# Patient Record
Sex: MALE | Race: WHITE | NOT HISPANIC OR LATINO | Employment: OTHER | URBAN - NONMETROPOLITAN AREA
[De-identification: names, ages, dates, MRNs, and addresses within clinical notes are randomized per-mention and may not be internally consistent; named-entity substitution may affect disease eponyms.]

---

## 2022-11-08 ENCOUNTER — APPOINTMENT (EMERGENCY)
Dept: RADIOLOGY | Facility: HOSPITAL | Age: 47
End: 2022-11-08

## 2022-11-08 ENCOUNTER — APPOINTMENT (EMERGENCY)
Dept: CT IMAGING | Facility: HOSPITAL | Age: 47
End: 2022-11-08

## 2022-11-08 ENCOUNTER — HOSPITAL ENCOUNTER (INPATIENT)
Facility: HOSPITAL | Age: 47
LOS: 2 days | Discharge: HOME | End: 2022-11-10
Attending: EMERGENCY MEDICINE | Admitting: STUDENT IN AN ORGANIZED HEALTH CARE EDUCATION/TRAINING PROGRAM

## 2022-11-08 DIAGNOSIS — K08.89 TOOTHACHE: ICD-10-CM

## 2022-11-08 DIAGNOSIS — K92.1 MELENA: Primary | ICD-10-CM

## 2022-11-08 DIAGNOSIS — I10 HYPERTENSION, UNSPECIFIED TYPE: ICD-10-CM

## 2022-11-08 DIAGNOSIS — K92.2 ACUTE UPPER GI BLEEDING: ICD-10-CM

## 2022-11-08 PROBLEM — F32.9 MDD (MAJOR DEPRESSIVE DISORDER): Status: ACTIVE | Noted: 2022-11-08

## 2022-11-08 LAB
ALBUMIN SERPL BCP-MCNC: 3.8 G/DL (ref 3.5–5)
ALP SERPL-CCNC: 95 U/L (ref 46–116)
ALT SERPL W P-5'-P-CCNC: 49 U/L (ref 12–78)
ANION GAP SERPL CALCULATED.3IONS-SCNC: 6 MMOL/L (ref 4–13)
APTT PPP: 25 SECONDS (ref 23–37)
AST SERPL W P-5'-P-CCNC: 28 U/L (ref 5–45)
BASOPHILS # BLD AUTO: 0.05 THOUSANDS/ÂΜL (ref 0–0.1)
BASOPHILS NFR BLD AUTO: 1 % (ref 0–1)
BILIRUB SERPL-MCNC: 0.24 MG/DL (ref 0.2–1)
BUN SERPL-MCNC: 18 MG/DL (ref 5–25)
CALCIUM SERPL-MCNC: 8.2 MG/DL (ref 8.3–10.1)
CHLORIDE SERPL-SCNC: 103 MMOL/L (ref 96–108)
CO2 SERPL-SCNC: 27 MMOL/L (ref 21–32)
CREAT SERPL-MCNC: 1.28 MG/DL (ref 0.6–1.3)
EOSINOPHIL # BLD AUTO: 0.3 THOUSAND/ÂΜL (ref 0–0.61)
EOSINOPHIL NFR BLD AUTO: 3 % (ref 0–6)
ERYTHROCYTE [DISTWIDTH] IN BLOOD BY AUTOMATED COUNT: 13.7 % (ref 11.6–15.1)
GFR SERPL CREATININE-BSD FRML MDRD: 67 ML/MIN/1.73SQ M
GLUCOSE SERPL-MCNC: 100 MG/DL (ref 65–140)
HCT VFR BLD AUTO: 40.9 % (ref 36.5–49.3)
HGB BLD-MCNC: 13.3 G/DL (ref 12–17)
IMM GRANULOCYTES # BLD AUTO: 0.03 THOUSAND/UL (ref 0–0.2)
IMM GRANULOCYTES NFR BLD AUTO: 0 % (ref 0–2)
INR PPP: 0.99 (ref 0.84–1.19)
LIPASE SERPL-CCNC: 138 U/L (ref 73–393)
LYMPHOCYTES # BLD AUTO: 3.33 THOUSANDS/ÂΜL (ref 0.6–4.47)
LYMPHOCYTES NFR BLD AUTO: 36 % (ref 14–44)
MAGNESIUM SERPL-MCNC: 1.8 MG/DL (ref 1.6–2.6)
MCH RBC QN AUTO: 27.5 PG (ref 26.8–34.3)
MCHC RBC AUTO-ENTMCNC: 32.5 G/DL (ref 31.4–37.4)
MCV RBC AUTO: 85 FL (ref 82–98)
MONOCYTES # BLD AUTO: 0.71 THOUSAND/ÂΜL (ref 0.17–1.22)
MONOCYTES NFR BLD AUTO: 8 % (ref 4–12)
NEUTROPHILS # BLD AUTO: 4.76 THOUSANDS/ÂΜL (ref 1.85–7.62)
NEUTS SEG NFR BLD AUTO: 52 % (ref 43–75)
NRBC BLD AUTO-RTO: 0 /100 WBCS
PLATELET # BLD AUTO: 330 THOUSANDS/UL (ref 149–390)
PMV BLD AUTO: 9 FL (ref 8.9–12.7)
POTASSIUM SERPL-SCNC: 4.1 MMOL/L (ref 3.5–5.3)
PROT SERPL-MCNC: 7.4 G/DL (ref 6.4–8.4)
PROTHROMBIN TIME: 13.2 SECONDS (ref 11.6–14.5)
RBC # BLD AUTO: 4.84 MILLION/UL (ref 3.88–5.62)
SODIUM SERPL-SCNC: 136 MMOL/L (ref 135–147)
WBC # BLD AUTO: 9.18 THOUSAND/UL (ref 4.31–10.16)

## 2022-11-08 RX ORDER — BUPROPION HYDROCHLORIDE 150 MG/1
300 TABLET ORAL DAILY
Status: DISCONTINUED | OUTPATIENT
Start: 2022-11-09 | End: 2022-11-08

## 2022-11-08 RX ORDER — TRAMADOL HYDROCHLORIDE 50 MG/1
50 TABLET ORAL EVERY 6 HOURS PRN
Status: DISCONTINUED | OUTPATIENT
Start: 2022-11-08 | End: 2022-11-10 | Stop reason: HOSPADM

## 2022-11-08 RX ORDER — ESCITALOPRAM OXALATE 10 MG/1
10 TABLET ORAL DAILY
Status: DISCONTINUED | OUTPATIENT
Start: 2022-11-08 | End: 2022-11-10 | Stop reason: HOSPADM

## 2022-11-08 RX ORDER — ONDANSETRON 2 MG/ML
4 INJECTION INTRAMUSCULAR; INTRAVENOUS EVERY 6 HOURS PRN
Status: DISCONTINUED | OUTPATIENT
Start: 2022-11-08 | End: 2022-11-10 | Stop reason: HOSPADM

## 2022-11-08 RX ORDER — BUPROPION HYDROCHLORIDE 300 MG/1
300 TABLET ORAL DAILY
COMMUNITY

## 2022-11-08 RX ORDER — AMOXICILLIN 250 MG/1
500 CAPSULE ORAL EVERY 8 HOURS SCHEDULED
Status: DISCONTINUED | OUTPATIENT
Start: 2022-11-08 | End: 2022-11-10 | Stop reason: HOSPADM

## 2022-11-08 RX ORDER — OXYCODONE HYDROCHLORIDE 5 MG/1
5 TABLET ORAL ONCE
Status: COMPLETED | OUTPATIENT
Start: 2022-11-08 | End: 2022-11-08

## 2022-11-08 RX ORDER — BUPROPION HYDROCHLORIDE 150 MG/1
300 TABLET ORAL DAILY
Status: DISCONTINUED | OUTPATIENT
Start: 2022-11-08 | End: 2022-11-10 | Stop reason: HOSPADM

## 2022-11-08 RX ORDER — ACETAMINOPHEN 325 MG/1
650 TABLET ORAL EVERY 6 HOURS PRN
Status: DISCONTINUED | OUTPATIENT
Start: 2022-11-08 | End: 2022-11-10 | Stop reason: HOSPADM

## 2022-11-08 RX ORDER — ESCITALOPRAM OXALATE 10 MG/1
10 TABLET ORAL DAILY
Status: DISCONTINUED | OUTPATIENT
Start: 2022-11-09 | End: 2022-11-08

## 2022-11-08 RX ORDER — ACETAMINOPHEN 325 MG/1
975 TABLET ORAL ONCE
Status: COMPLETED | OUTPATIENT
Start: 2022-11-08 | End: 2022-11-08

## 2022-11-08 RX ORDER — LISINOPRIL 20 MG/1
20 TABLET ORAL DAILY
Status: DISCONTINUED | OUTPATIENT
Start: 2022-11-09 | End: 2022-11-10 | Stop reason: HOSPADM

## 2022-11-08 RX ORDER — TRAMADOL HYDROCHLORIDE 50 MG/1
50 TABLET ORAL EVERY 6 HOURS PRN
Status: DISCONTINUED | OUTPATIENT
Start: 2022-11-08 | End: 2022-11-08 | Stop reason: SDUPTHER

## 2022-11-08 RX ORDER — TRAMADOL HYDROCHLORIDE 50 MG/1
50 TABLET ORAL EVERY 6 HOURS PRN
COMMUNITY

## 2022-11-08 RX ORDER — ESCITALOPRAM OXALATE 10 MG/1
10 TABLET ORAL DAILY
COMMUNITY

## 2022-11-08 RX ADMIN — AMOXICILLIN 500 MG: 250 CAPSULE ORAL at 21:22

## 2022-11-08 RX ADMIN — SODIUM CHLORIDE 1000 ML: 0.9 INJECTION, SOLUTION INTRAVENOUS at 16:38

## 2022-11-08 RX ADMIN — BUPROPION HYDROCHLORIDE 300 MG: 150 TABLET, EXTENDED RELEASE ORAL at 21:22

## 2022-11-08 RX ADMIN — ESCITALOPRAM OXALATE 10 MG: 10 TABLET ORAL at 21:22

## 2022-11-08 RX ADMIN — SODIUM CHLORIDE 80 MG: 9 INJECTION, SOLUTION INTRAVENOUS at 16:47

## 2022-11-08 RX ADMIN — TRAMADOL HYDROCHLORIDE 50 MG: 50 TABLET ORAL at 22:49

## 2022-11-08 RX ADMIN — OXYCODONE HYDROCHLORIDE 5 MG: 5 TABLET ORAL at 17:55

## 2022-11-08 RX ADMIN — ACETAMINOPHEN 975 MG: 325 TABLET ORAL at 16:53

## 2022-11-08 RX ADMIN — SODIUM CHLORIDE 8 MG/HR: 900 INJECTION, SOLUTION INTRAVENOUS at 16:48

## 2022-11-08 RX ADMIN — IOHEXOL 100 ML: 350 INJECTION, SOLUTION INTRAVENOUS at 18:03

## 2022-11-08 NOTE — ED PROVIDER NOTES
History  Chief Complaint   Patient presents with   • Rectal Bleeding     Patient noticed blood in stool today  Patient stated yesterday he felt weak, had a syncopal episode and felt sob  Today patient feels the same  The patient is a 41year old male, who presents with the c/o melena x today  The patient states that he has been dealing with dental pain has been taking antibiotics and naproxen  Patient states that today was his 1st bowel movement in a few days  Patient states that it was dark black  Patient states he had some “acid reflux like pain last evening” in his left upper abdomen  States it was a burning sensation  Did take his omeprazole without improvement  Patient states that today he has a generalized ache in his abdomen  No nausea vomiting fevers chills  Rectal Bleeding - Minor  Quality:  Black and tarry  Amount: Moderate  Duration:  1 day  Chronicity:  New  Similar prior episodes: no    Relieved by:  None tried  Worsened by:  Nothing  Ineffective treatments:  None tried  Associated symptoms: abdominal pain    Associated symptoms: no dizziness, no fever, no hematemesis, no light-headedness, no recent illness and no vomiting    Abdominal pain:     Location:  Generalized    Quality: aching      Severity:  Mild    Onset quality:  Unable to specify    Timing:  Constant    Progression:  Unable to specify    Chronicity:  New  Risk factors: NSAID use        None       Past Medical History:   Diagnosis Date   • Hypertension    • Psychiatric disorder        Past Surgical History:   Procedure Laterality Date   • HERNIA REPAIR         History reviewed  No pertinent family history  I have reviewed and agree with the history as documented      E-Cigarette/Vaping   • E-Cigarette Use Never User      E-Cigarette/Vaping Substances     Social History     Tobacco Use   • Smoking status: Never Smoker   • Smokeless tobacco: Never Used   Vaping Use   • Vaping Use: Never used   Substance Use Topics   • Alcohol use: Not Currently   • Drug use: Not Currently       Review of Systems   Constitutional: Negative for chills and fever  HENT: Negative for ear pain  Eyes: Negative for pain and visual disturbance  Respiratory: Negative for shortness of breath and stridor  Cardiovascular: Negative for chest pain and palpitations  Gastrointestinal: Positive for abdominal pain and hematochezia  Negative for hematemesis, nausea and vomiting  Genitourinary: Negative for dysuria and hematuria  Musculoskeletal: Negative for arthralgias and back pain  Skin: Negative for color change and rash  Neurological: Negative for dizziness, seizures, speech difficulty and light-headedness  All other systems reviewed and are negative  Physical Exam  Physical Exam  Vitals and nursing note reviewed  Constitutional:       Appearance: He is well-developed  HENT:      Head: Normocephalic and atraumatic  Eyes:      Extraocular Movements: Extraocular movements intact  Conjunctiva/sclera: Conjunctivae normal       Pupils: Pupils are equal, round, and reactive to light  Cardiovascular:      Rate and Rhythm: Normal rate and regular rhythm  Heart sounds: No murmur heard  Pulmonary:      Effort: Pulmonary effort is normal  No respiratory distress  Breath sounds: Normal breath sounds  Abdominal:      Palpations: Abdomen is soft  Tenderness: There is no abdominal tenderness  There is no right CVA tenderness or left CVA tenderness  Genitourinary:     Rectum: Guaiac result positive  No external hemorrhoid or internal hemorrhoid  Comments: Dark black stool on VASILE   Musculoskeletal:      Cervical back: Neck supple  Right lower leg: No edema  Left lower leg: No edema  Skin:     General: Skin is warm and dry  Capillary Refill: Capillary refill takes less than 2 seconds  Neurological:      General: No focal deficit present        Mental Status: He is alert and oriented to person, place, and time           Vital Signs  ED Triage Vitals [11/08/22 1618]   Temperature Pulse Respirations Blood Pressure SpO2   99 1 °F (37 3 °C) 99 20 145/86 96 %      Temp Source Heart Rate Source Patient Position - Orthostatic VS BP Location FiO2 (%)   Temporal Monitor Lying Right arm --      Pain Score       8           Vitals:    11/08/22 1618 11/08/22 1737 11/08/22 1815 11/08/22 1900   BP: 145/86 144/70 157/92 116/62   Pulse: 99 91 85 90   Patient Position - Orthostatic VS: Lying            Visual Acuity      ED Medications  Medications   pantoprazole (PROTONIX) 80 mg in sodium chloride 0 9 % 100 mL infusion (8 mg/hr Intravenous New Bag 11/8/22 1648)   ondansetron (ZOFRAN) injection 4 mg (has no administration in time range)   acetaminophen (TYLENOL) tablet 650 mg (has no administration in time range)   traMADol (ULTRAM) tablet 50 mg (has no administration in time range)   buPROPion (WELLBUTRIN XL) 24 hr tablet 300 mg (has no administration in time range)   lisinopril (ZESTRIL) tablet 20 mg (has no administration in time range)   escitalopram (LEXAPRO) tablet 10 mg (has no administration in time range)   traMADol (ULTRAM) tablet 50 mg (has no administration in time range)   amoxicillin (AMOXIL) capsule 500 mg (has no administration in time range)   sodium chloride 0 9 % bolus 1,000 mL (0 mL Intravenous Stopped 11/8/22 1739)   pantoprazole (PROTONIX) 80 mg in sodium chloride 0 9 % 100 mL IVPB (0 mg Intravenous Stopped 11/8/22 1702)   acetaminophen (TYLENOL) tablet 975 mg (975 mg Oral Given 11/8/22 1653)   oxyCODONE (ROXICODONE) IR tablet 5 mg (5 mg Oral Given 11/8/22 1755)   iohexol (OMNIPAQUE) 350 MG/ML injection (SINGLE-DOSE) 100 mL (100 mL Intravenous Given 11/8/22 1803)       Diagnostic Studies  Results Reviewed     Procedure Component Value Units Date/Time    Comprehensive metabolic panel [[id number]]  (Abnormal) Collected: 11/08/22 1638    Lab Status: Final result Specimen: Blood from Arm, Right Updated: 11/08/22 1721     Sodium 136 mmol/L      Potassium 4 1 mmol/L      Chloride 103 mmol/L      CO2 27 mmol/L      ANION GAP 6 mmol/L      BUN 18 mg/dL      Creatinine 1 28 mg/dL      Glucose 100 mg/dL      Calcium 8 2 mg/dL      AST 28 U/L      ALT 49 U/L      Alkaline Phosphatase 95 U/L      Total Protein 7 4 g/dL      Albumin 3 8 g/dL      Total Bilirubin 0 24 mg/dL      eGFR 67 ml/min/1 73sq m     Narrative:      [place] guidelines for Chronic Kidney Disease (CKD):   •  Stage 1 with normal or high GFR (GFR > 90 mL/min/1 73 square meters)  •  Stage 2 Mild CKD (GFR = 60-89 mL/min/1 73 square meters)  •  Stage 3A Moderate CKD (GFR = 45-59 mL/min/1 73 square meters)  •  Stage 3B Moderate CKD (GFR = 30-44 mL/min/1 73 square meters)  •  Stage 4 Severe CKD (GFR = 15-29 mL/min/1 73 square meters)  •  Stage 5 End Stage CKD (GFR <15 mL/min/1 73 square meters)  Note: GFR calculation is accurate only with a steady state creatinine    Magnesium [[id number]]  (Normal) Collected: 11/08/22 1638    Lab Status: Final result Specimen: Blood from Arm, Right Updated: 11/08/22 1721     Magnesium 1 8 mg/dL     Lipase [[id number]]  (Normal) Collected: 11/08/22 1638    Lab Status: Final result Specimen: Blood from Arm, Right Updated: 11/08/22 1721     Lipase 138 u/L     Protime-INR [[id number]]  (Normal) Collected: 11/08/22 1638    Lab Status: Final result Specimen: Blood from Arm, Right Updated: 11/08/22 1658     Protime 13 2 seconds      INR 0 99    APTT [[id number]]  (Normal) Collected: 11/08/22 1638    Lab Status: Final result Specimen: Blood from Arm, Right Updated: 11/08/22 1658     PTT 25 seconds     CBC and differential [[id number]] Collected: 11/08/22 1638    Lab Status: Final result Specimen: Blood from Arm, Right Updated: 11/08/22 1645     WBC 9 18 Thousand/uL      RBC 4 84 Million/uL      Hemoglobin 13 3 g/dL      Hematocrit 40 9 %      MCV 85 fL      MCH 27 5 pg      MCHC 32 5 g/dL      RDW 13 7 %      MPV 9 0 fL      Platelets 416 Thousands/uL      nRBC 0 /100 WBCs      Neutrophils Relative 52 %      Immat GRANS % 0 %      Lymphocytes Relative 36 %      Monocytes Relative 8 %      Eosinophils Relative 3 %      Basophils Relative 1 %      Neutrophils Absolute 4 76 Thousands/µL      Immature Grans Absolute 0 03 Thousand/uL      Lymphocytes Absolute 3 33 Thousands/µL      Monocytes Absolute 0 71 Thousand/µL      Eosinophils Absolute 0 30 Thousand/µL      Basophils Absolute 0 05 Thousands/µL     UA w Reflex to Microscopic w Reflex to Culture [[id number]]     Lab Status: No result Specimen: Urine                  CT abdomen pelvis with contrast   Final Result by Celia Krishna MD (11/08 1900)      No acute inflammatory changes in the abdomen or the pelvis  Workstation performed: [id number]         XR chest 1 view portable    (Results Pending)              Procedures  Procedures         ED Course  ED Course as of 11/08/22 1927 Tue Nov 08, 2022   1650 Hemoglobin: 13 3   1835 Speaking a Dr Paz Mix with GI about patient   1918 Patient placed on observation, GI consult placed for tomorrow                                                MDM  Number of Diagnoses or Management Options     Amount and/or Complexity of Data Reviewed  Clinical lab tests: ordered and reviewed  Tests in the radiology section of CPT®: ordered and reviewed  Decide to obtain previous medical records or to obtain history from someone other than the patient: yes  Review and summarize past medical records: yes  Independent visualization of images, tracings, or specimens: yes    Risk of Complications, Morbidity, and/or Mortality  Presenting problems: moderate  Diagnostic procedures: moderate  Management options: moderate    Patient Progress  Patient progress: stable      Disposition  Final diagnoses:   Melena     Time reflects when diagnosis was documented in both MDM as applicable and the Disposition within this note     Time User Action Codes Description Comment    11/8/2022  7:13 PM Susanna Gunn Add [K92 1] Dahlia       ED Disposition     ED Disposition   Admit    Condition   Stable    Date/Time   Tue Nov 8, 2022  7:17 PM    Comment   Case was discussed with lola morse  and the patient's admission status was agreed to be Admission Status: inpatient status to the service of Dr Paresh Jesus    Follow-up Information    None         Patient's Medications    No medications on file       No discharge procedures on file      PDMP Review     None          ED Provider  Electronically Signed by           Toyin Melvin PA-C  11/08/22 1928

## 2022-11-08 NOTE — ED NOTES
Pt adamantly requesting morphine for a toothache  Provider made aware        Preston Myers RN  42/09/87 [id number]

## 2022-11-09 ENCOUNTER — ANESTHESIA (INPATIENT)
Dept: GASTROENTEROLOGY | Facility: HOSPITAL | Age: 47
End: 2022-11-09

## 2022-11-09 ENCOUNTER — ANESTHESIA EVENT (INPATIENT)
Dept: GASTROENTEROLOGY | Facility: HOSPITAL | Age: 47
End: 2022-11-09

## 2022-11-09 ENCOUNTER — APPOINTMENT (OUTPATIENT)
Dept: GASTROENTEROLOGY | Facility: HOSPITAL | Age: 47
End: 2022-11-09

## 2022-11-09 PROBLEM — E66.9 OBESITY (BMI 30-39.9): Status: ACTIVE | Noted: 2022-11-09

## 2022-11-09 LAB
BILIRUB UR QL STRIP: NEGATIVE
CLARITY UR: CLEAR
COLOR UR: YELLOW
GLUCOSE UR STRIP-MCNC: NEGATIVE MG/DL
HGB BLD-MCNC: 11.3 G/DL (ref 12–17)
HGB BLD-MCNC: 12.8 G/DL (ref 12–17)
HGB UR QL STRIP.AUTO: NEGATIVE
KETONES UR STRIP-MCNC: NEGATIVE MG/DL
LEUKOCYTE ESTERASE UR QL STRIP: NEGATIVE
NITRITE UR QL STRIP: NEGATIVE
PH UR STRIP.AUTO: 5.5 [PH]
PROT UR STRIP-MCNC: NEGATIVE MG/DL
SP GR UR STRIP.AUTO: 1.01 (ref 1–1.03)
UROBILINOGEN UR QL STRIP.AUTO: 0.2 E.U./DL

## 2022-11-09 PROCEDURE — 0DB68ZX EXCISION OF STOMACH, VIA NATURAL OR ARTIFICIAL OPENING ENDOSCOPIC, DIAGNOSTIC: ICD-10-PCS | Performed by: INTERNAL MEDICINE

## 2022-11-09 RX ORDER — PREGABALIN 75 MG/1
75 CAPSULE ORAL 2 TIMES DAILY
COMMUNITY

## 2022-11-09 RX ORDER — LIDOCAINE HYDROCHLORIDE 20 MG/ML
INJECTION, SOLUTION EPIDURAL; INFILTRATION; INTRACAUDAL; PERINEURAL AS NEEDED
Status: DISCONTINUED | OUTPATIENT
Start: 2022-11-09 | End: 2022-11-09

## 2022-11-09 RX ORDER — SODIUM CHLORIDE 9 MG/ML
100 INJECTION, SOLUTION INTRAVENOUS CONTINUOUS
Status: DISCONTINUED | OUTPATIENT
Start: 2022-11-09 | End: 2022-11-10

## 2022-11-09 RX ORDER — SODIUM CHLORIDE, SODIUM LACTATE, POTASSIUM CHLORIDE, CALCIUM CHLORIDE 600; 310; 30; 20 MG/100ML; MG/100ML; MG/100ML; MG/100ML
INJECTION, SOLUTION INTRAVENOUS CONTINUOUS PRN
Status: DISCONTINUED | OUTPATIENT
Start: 2022-11-09 | End: 2022-11-09

## 2022-11-09 RX ORDER — PREGABALIN 75 MG/1
75 CAPSULE ORAL 2 TIMES DAILY
Status: DISCONTINUED | OUTPATIENT
Start: 2022-11-09 | End: 2022-11-10 | Stop reason: HOSPADM

## 2022-11-09 RX ORDER — OMEPRAZOLE 40 MG/1
40 CAPSULE, DELAYED RELEASE ORAL DAILY
COMMUNITY
End: 2022-11-10

## 2022-11-09 RX ORDER — LANOLIN ALCOHOL/MO/W.PET/CERES
6 CREAM (GRAM) TOPICAL
Status: DISCONTINUED | OUTPATIENT
Start: 2022-11-09 | End: 2022-11-10 | Stop reason: HOSPADM

## 2022-11-09 RX ORDER — PROPOFOL 10 MG/ML
INJECTION, EMULSION INTRAVENOUS AS NEEDED
Status: DISCONTINUED | OUTPATIENT
Start: 2022-11-09 | End: 2022-11-09

## 2022-11-09 RX ORDER — SODIUM CHLORIDE, SODIUM LACTATE, POTASSIUM CHLORIDE, CALCIUM CHLORIDE 600; 310; 30; 20 MG/100ML; MG/100ML; MG/100ML; MG/100ML
75 INJECTION, SOLUTION INTRAVENOUS CONTINUOUS
Status: DISCONTINUED | OUTPATIENT
Start: 2022-11-09 | End: 2022-11-10

## 2022-11-09 RX ADMIN — SODIUM CHLORIDE 100 ML/HR: 0.9 INJECTION, SOLUTION INTRAVENOUS at 20:56

## 2022-11-09 RX ADMIN — ESCITALOPRAM OXALATE 10 MG: 10 TABLET ORAL at 08:56

## 2022-11-09 RX ADMIN — AMOXICILLIN 500 MG: 250 CAPSULE ORAL at 05:11

## 2022-11-09 RX ADMIN — SODIUM CHLORIDE, SODIUM LACTATE, POTASSIUM CHLORIDE, AND CALCIUM CHLORIDE: .6; .31; .03; .02 INJECTION, SOLUTION INTRAVENOUS at 10:21

## 2022-11-09 RX ADMIN — PROPOFOL 40 MG: 10 INJECTION, EMULSION INTRAVENOUS at 10:28

## 2022-11-09 RX ADMIN — SODIUM CHLORIDE 100 ML/HR: 0.9 INJECTION, SOLUTION INTRAVENOUS at 11:11

## 2022-11-09 RX ADMIN — PREGABALIN 75 MG: 75 CAPSULE ORAL at 11:19

## 2022-11-09 RX ADMIN — ACETAMINOPHEN 650 MG: 325 TABLET ORAL at 21:16

## 2022-11-09 RX ADMIN — PROPOFOL 40 MG: 10 INJECTION, EMULSION INTRAVENOUS at 10:31

## 2022-11-09 RX ADMIN — PROPOFOL 10 MG: 10 INJECTION, EMULSION INTRAVENOUS at 10:26

## 2022-11-09 RX ADMIN — BUPROPION HYDROCHLORIDE 300 MG: 150 TABLET, EXTENDED RELEASE ORAL at 08:56

## 2022-11-09 RX ADMIN — AMOXICILLIN 500 MG: 250 CAPSULE ORAL at 13:02

## 2022-11-09 RX ADMIN — TRAMADOL HYDROCHLORIDE 50 MG: 50 TABLET ORAL at 05:11

## 2022-11-09 RX ADMIN — PREGABALIN 75 MG: 75 CAPSULE ORAL at 17:28

## 2022-11-09 RX ADMIN — LISINOPRIL 20 MG: 20 TABLET ORAL at 08:56

## 2022-11-09 RX ADMIN — PROPOFOL 150 MG: 10 INJECTION, EMULSION INTRAVENOUS at 10:25

## 2022-11-09 RX ADMIN — TRAMADOL HYDROCHLORIDE 50 MG: 50 TABLET ORAL at 11:19

## 2022-11-09 RX ADMIN — SODIUM CHLORIDE 8 MG/HR: 900 INJECTION, SOLUTION INTRAVENOUS at 12:55

## 2022-11-09 RX ADMIN — TRAMADOL HYDROCHLORIDE 50 MG: 50 TABLET ORAL at 17:28

## 2022-11-09 RX ADMIN — ACETAMINOPHEN 650 MG: 325 TABLET ORAL at 15:14

## 2022-11-09 RX ADMIN — LIDOCAINE HYDROCHLORIDE 100 MG: 20 INJECTION, SOLUTION EPIDURAL; INFILTRATION; INTRACAUDAL at 10:25

## 2022-11-09 RX ADMIN — ACETAMINOPHEN 650 MG: 325 TABLET ORAL at 08:59

## 2022-11-09 RX ADMIN — AMOXICILLIN 500 MG: 250 CAPSULE ORAL at 21:16

## 2022-11-09 NOTE — ASSESSMENT & PLAN NOTE
· Patient has a history of ulcer many years ago  Has been taking naproxen twice a day for some time  Developed upper abdominal pain had a bowel movement today with melena, syncopal episode in pharmacy and ER  · NPO,  Protonix drip      · Gastroenterology for EGD 11/9  · Hemoglobin is stable 12, baseline in Care everywhere 11-13

## 2022-11-09 NOTE — PROGRESS NOTES
Patient is alert and oriented  Encouraged patient to use call bell to ring for assistance if needs to get up post EGD  Patient tolerating lunch and medication PO  Patient requiring prn pain medication due to a tooth ache on his Right lower tooth

## 2022-11-09 NOTE — H&P
[address]  H&P- Vianney Reinoso 1975, 41 y o  male MRN: [id number]  Unit/Bed#: ED 02 Encounter: [id number]  Primary Care Provider: Jarocho Casanova MD   Date and time admitted to hospital: 11/8/2022  4:14 PM    * Acute upper GI bleeding  Assessment & Plan  · Patient has a history of ulcer many years ago  Has been taking naproxen twice a day for some time  Developed upper abdominal pain had a bowel movement today with melena  Hemoglobin is stable  Start clears NPO at midnight Protonix drip  Gastroenterology for EGD   · H&H q 8 hours hemodynamically stable    Hypertension  Assessment & Plan  · Resume lisinopril controlled    MDD (major depressive disorder)  Assessment & Plan  · Continue Wellbutrin Lexapro    Toothache  Assessment & Plan  · Left lower for some time he has an appointment with dentist on Thursday he currently got antibiotics amoxicilin 500 mg p o  T i d  also will give some tramadol for pain      VTE Pharmacologic Prophylaxis: VTE Score: 2 Low Risk (Score 0-2) - Encourage Ambulation  Code Status: Level 1 - Full Code   Discussion with family: patient    Anticipated Length of Stay: Patient will be admitted on an inpatient basis with an anticipated length of stay of greater than 2 midnights secondary to GI bleed  Total Time for Visit, including Counseling / Coordination of Care: 60 minutes Greater than 50% of this total time spent on direct patient counseling and coordination of care  Chief Complaint:  Melena    History of Present Illness:  Vianney Reinoso is a 41 y o  male with a PMH of peptic ulcer disease and hypertension who presents with with episode of melena today in some epigastric pain  Patient has had a toothache for some time and has been taking naproxen twice a day for some time  States that meloxicam his stop taking while he has naproxen with a toothache his appointment with dentist on Thursday and he has been placed on antibiotics amoxicillin      Review of Systems:  Review of Systems   Constitutional: Negative for chills and fever  HENT: Negative for ear pain and sore throat  Eyes: Negative for pain and visual disturbance  Respiratory: Negative for cough and shortness of breath  Cardiovascular: Negative for chest pain and palpitations  Gastrointestinal: Positive for abdominal pain and blood in stool  Negative for vomiting  Genitourinary: Negative for dysuria and hematuria  Musculoskeletal: Negative for arthralgias and back pain  Skin: Negative for color change and rash  Neurological: Negative for seizures and syncope  All other systems reviewed and are negative  Past Medical and Surgical History:   Past Medical History:   Diagnosis Date   • Hypertension    • Psychiatric disorder        Past Surgical History:   Procedure Laterality Date   • HERNIA REPAIR         Meds/Allergies:  Prior to Admission medications    Not on File     I have reviewed home medications with patient personally  Allergies: No Known Allergies    Social History:  Marital Status: Unknown   Substance Use History:   Social History     Substance and Sexual Activity   Alcohol Use Not Currently     Social History     Tobacco Use   Smoking Status Never Smoker   Smokeless Tobacco Never Used     Social History     Substance and Sexual Activity   Drug Use Not Currently       Family History:  History reviewed  No pertinent family history  Physical Exam:     Vitals:   Blood Pressure: 116/62 (11/08/22 1900)  Pulse: 90 (11/08/22 1900)  Temperature: 99 1 °F (37 3 °C) (11/08/22 1618)  Temp Source: Temporal (11/08/22 1618)  Respirations: 20 (11/08/22 1900)  Weight - Scale: 118 kg (259 lb 1 6 oz) (11/08/22 1618)  SpO2: 98 % (11/08/22 1900)    Physical Exam  Vitals and nursing note reviewed  Constitutional:       Appearance: He is well-developed  HENT:      Head: Normocephalic and atraumatic     Eyes:      Conjunctiva/sclera: Conjunctivae normal    Cardiovascular:      Rate and Rhythm: Normal rate and regular rhythm  Heart sounds: No murmur heard  Pulmonary:      Effort: Pulmonary effort is normal  No respiratory distress  Breath sounds: Normal breath sounds  No wheezing or rales  Abdominal:      Palpations: Abdomen is soft  Tenderness: There is abdominal tenderness (epigastric)  Musculoskeletal:         General: No swelling  Cervical back: Neck supple  Skin:     General: Skin is warm and dry  Neurological:      Mental Status: He is alert and oriented to person, place, and time  Additional Data:     Lab Results:  Results from last 7 days   Lab Units 11/08/22  1638   WBC Thousand/uL 9 18   HEMOGLOBIN g/dL 13 3   HEMATOCRIT % 40 9   PLATELETS Thousands/uL 330   NEUTROS PCT % 52   LYMPHS PCT % 36   MONOS PCT % 8   EOS PCT % 3     Results from last 7 days   Lab Units 11/08/22  1638   SODIUM mmol/L 136   POTASSIUM mmol/L 4 1   CHLORIDE mmol/L 103   CO2 mmol/L 27   BUN mg/dL 18   CREATININE mg/dL 1 28   ANION GAP mmol/L 6   CALCIUM mg/dL 8 2*   ALBUMIN g/dL 3 8   TOTAL BILIRUBIN mg/dL 0 24   ALK PHOS U/L 95   ALT U/L 49   AST U/L 28   GLUCOSE RANDOM mg/dL 100     Results from last 7 days   Lab Units 11/08/22  1638   INR  0 99                   Imaging: Reviewed radiology reports from this admission including: abdominal/pelvic CT  CT abdomen pelvis with contrast   Final Result by Kaitlin Washington MD (11/08 1900)      No acute inflammatory changes in the abdomen or the pelvis  Workstation performed: [id number]         XR chest 1 view portable    (Results Pending)       EKG and Other Studies Reviewed on Admission:   · EKG: No EKG obtained  ** Please Note: This note has been constructed using a voice recognition system   **

## 2022-11-09 NOTE — PLAN OF CARE
Problem: GASTROINTESTINAL - ADULT  Goal: Maintains or returns to baseline bowel function  Description: INTERVENTIONS:  - Assess bowel function  - Encourage oral fluids to ensure adequate hydration  - Administer IV fluids if ordered to ensure adequate hydration  - Administer ordered medications as needed PPI  - Encourage mobilization and activity  - Consider nutritional services referral to assist patient with adequate nutrition and appropriate food choices  Outcome: Progressing

## 2022-11-09 NOTE — PROGRESS NOTES
[address]  Progress Note Danette Lucas 1975, 41 y o  male MRN: [id number]  Unit/Bed#: -01 Encounter: [id number]  Primary Care Provider: Koki Haddad MD   Date and time admitted to hospital: 11/8/2022  4:14 PM    * Acute upper GI bleeding  Assessment & Plan  · Patient has a history of ulcer many years ago  Has been taking naproxen twice a day for some time  Developed upper abdominal pain had a bowel movement today with melena, syncopal episode in pharmacy and ER  · NPO,  Protonix drip  · Gastroenterology for EGD 11/9  · Hemoglobin is stable 12, baseline in Care everywhere 11-13     Hypertension  Assessment & Plan  · Resume lisinopril controlled  · consider holding with BP 110s this AM and x2 syncopal episodes     MDD (major depressive disorder)  Assessment & Plan  · Continue Wellbutrin Lexapro    Toothache  Assessment & Plan  · Left lower for some time he has an appointment with dentist on Thursday he currently got antibiotics amoxicilin 500 mg p o  T i d    · PRN tramadol for pain  · PDMP reviewed filler tylenol w/codine 11/6  · Dentist appointment scheduled 1030am 11/10- will discharge tomorrow AM with no ride today and unable to drive self with EGD sedation      VTE Pharmacologic Prophylaxis: VTE Score: 2 Low Risk (Score 0-2) - Encourage Ambulation  Patient Centered Rounds: I performed bedside rounds with nursing staff today  Discussions with Specialists or Other Care Team Provider: GI, CM    Education and Discussions with Family / Patient: Patient declined call to   Time Spent for Care: 30 minutes  More than 50% of total time spent on counseling and coordination of care as described above  Current Length of Stay: 1 day(s)  Current Patient Status: Inpatient   Certification Statement: The patient will continue to require additional inpatient hospital stay due to EGD today  Discharge Plan: Anticipate discharge tomorrow to home      Code Status: Level 1 - Full Code    Subjective:   Patient states he has been taking naproxen more than normal a with lower 2s infection and has dentist appointment tomorrow at 10:30 a m  For extraction  Scheduled for EGD today, patient states he does not have a ride home to be discharged this afternoon, no and he could call to provider ride and he drove himself to the ED  Unable to discharge patient after procedure without a ride due to receiving sedation, will plan for discharge tomorrow morning for him to be able to make his scheduled dental appointment  Patient states he still feeling "woozy" and weak    Objective:     Vitals:   Temp (24hrs), Av 5 °F (36 9 °C), Min:97 9 °F (36 6 °C), Max:99 1 °F (37 3 °C)    Temp:  [97 9 °F (36 6 °C)-99 1 °F (37 3 °C)] 97 9 °F (36 6 °C)  HR:  [85-99] 90  Resp:  [16-20] 16  BP: (116-159)/() 118/80  SpO2:  [96 %-100 %] 98 %  There is no height or weight on file to calculate BMI  Input and Output Summary (last 24 hours): Intake/Output Summary (Last 24 hours) at 2022 0943  Last data filed at 2022 2259  Gross per 24 hour   Intake 641 83 ml   Output 200 ml   Net 441 83 ml       Physical Exam:   Physical Exam  Vitals and nursing note reviewed  Constitutional:       Appearance: He is well-developed  He is not ill-appearing  HENT:      Head: Normocephalic and atraumatic  Mouth/Throat:      Mouth: Mucous membranes are moist    Eyes:      Conjunctiva/sclera: Conjunctivae normal       Pupils: Pupils are equal, round, and reactive to light  Cardiovascular:      Rate and Rhythm: Normal rate and regular rhythm  Pulses: Normal pulses  Heart sounds: Normal heart sounds  No murmur heard  Pulmonary:      Effort: Pulmonary effort is normal  No respiratory distress  Breath sounds: Normal breath sounds  Abdominal:      General: Bowel sounds are normal  There is no distension  Palpations: Abdomen is soft  Tenderness:  There is no abdominal tenderness  Musculoskeletal:      Cervical back: Neck supple  Right lower leg: No edema  Left lower leg: No edema  Skin:     General: Skin is warm and dry  Neurological:      General: No focal deficit present  Mental Status: He is alert  Psychiatric:         Mood and Affect: Mood normal          Thought Content: Thought content normal           Additional Data:     Labs:  Results from last 7 days   Lab Units 11/09/22  0802 11/09/22  0018 11/08/22  1638   WBC Thousand/uL  --   --  9 18   HEMOGLOBIN g/dL 12 8   < > 13 3   HEMATOCRIT %  --   --  40 9   PLATELETS Thousands/uL  --   --  330   NEUTROS PCT %  --   --  52   LYMPHS PCT %  --   --  36   MONOS PCT %  --   --  8   EOS PCT %  --   --  3    < > = values in this interval not displayed       Results from last 7 days   Lab Units 11/08/22  1638   SODIUM mmol/L 136   POTASSIUM mmol/L 4 1   CHLORIDE mmol/L 103   CO2 mmol/L 27   BUN mg/dL 18   CREATININE mg/dL 1 28   ANION GAP mmol/L 6   CALCIUM mg/dL 8 2*   ALBUMIN g/dL 3 8   TOTAL BILIRUBIN mg/dL 0 24   ALK PHOS U/L 95   ALT U/L 49   AST U/L 28   GLUCOSE RANDOM mg/dL 100     Results from last 7 days   Lab Units 11/08/22  1638   INR  0 99                   Lines/Drains:  Invasive Devices  Report    Peripheral Intravenous Line  Duration           Peripheral IV 11/08/22 Right Antecubital <1 day                      Imaging: Reviewed radiology reports from this admission including: chest xray and abdominal/pelvic CT    Recent Cultures (last 7 days):         Last 24 Hours Medication List:   Current Facility-Administered Medications   Medication Dose Route Frequency Provider Last Rate   • acetaminophen  650 mg Oral Q6H PRN Faby Lazar MD     • amoxicillin  500 mg Oral [facility] Faby Lazar MD     • buPROPion  300 mg Oral Daily JOSH Gupta     • escitalopram  10 mg Oral Daily JOSH Gupta     • lisinopril  20 mg Oral Daily Faby Lazar MD     • ondansetron  4 mg Intravenous Q6H PRN Amara Duvall MD     • pantoprozole (PROTONIX) infusion (Continuous)  8 mg/hr Intravenous Continuous Katina Espino PA-C 8 mg/hr (11/08/22 6509)   • pregabalin  75 mg Oral BID JOSH Webster     • sodium chloride  100 mL/hr Intravenous Continuous JOSH Webster     • traMADol  50 mg Oral Q6H PRN Amara Duvall MD          Today, Patient Was Seen By: Heath Lion    **Please Note: This note may have been constructed using a voice recognition system  **

## 2022-11-09 NOTE — UTILIZATION REVIEW
Initial Clinical Review    Admission: Date/Time/Statement:   Admission Orders (From admission, onward)     Ordered        11/08/22 1917  INPATIENT ADMISSION  Once                      Orders Placed This Encounter   Procedures   • INPATIENT ADMISSION     Standing Status:   Standing     Number of Occurrences:   1     Order Specific Question:   Level of Care     Answer:   Med Surg [16]     Order Specific Question:   Estimated length of stay     Answer:   More than 2 Midnights     Order Specific Question:   Certification     Answer:   I certify that inpatient services are medically necessary for this patient for a duration of greater than two midnights  See H&P and MD Progress Notes for additional information about the patient's course of treatment  ED Arrival Information     Expected   -    Arrival   11/8/2022 16:09    Acuity   Emergent            Means of arrival   Wheelchair    Escorted by   Self    Service   Hospitalist    Admission type   Emergency            Arrival complaint   sob, bloody stool            Chief Complaint   Patient presents with   • Rectal Bleeding     Patient noticed blood in stool today  Patient stated yesterday he felt weak, had a syncopal episode and felt sob  Today patient feels the same  Initial Presentation: 41 y o  male with PMHx of PUD, HTN, MDD presents to ED via wheelchair with c/o upper abdominal pain, melena  States he's been taking naproxen 2x/day for some time d/t a toothache  Had seen his dentist and place on po amoxicillin  In ED H/H stable  CT a/p with no acute abnl  Started on Protonix gtt  Admit inpatient to M/S unit  -- continue protonix gtt  Npo  H/H q8h  Continue pta home po meds  GI consulted  Date: 11/9   Day 2: Pt states he still feeling "whoozy" and weak  Npo for EGD today  Continue prn tramadol  Protonix gtt       GI consult -- Melena, Acute blood loss anemia, Syncope -- -concern for upper GI bleed in the setting of chronic NSAID use and melena  hgb currently stable  Continue Protonix infusion for now, Plan on EGD today       Post EGD --  RECOMMENDATION:  Await pathology results  High-dose PPI for 2 months then reduce to once daily  May resume regular diet  Repeat EGD in 3 months  Consider alternative etiologies for presyncope as there was not significant drop in his hemoglobin or changes in his vital signs        ED Triage Vitals [11/08/22 1618]   Temperature Pulse Respirations Blood Pressure SpO2   99 1 °F (37 3 °C) 99 20 145/86 96 %      Temp Source Heart Rate Source Patient Position - Orthostatic VS BP Location FiO2 (%)   Temporal Monitor Lying Right arm --      Pain Score       8          Wt Readings from Last 1 Encounters:   11/08/22 116 kg (255 lb 12 8 oz)     Additional Vital Signs:   Date/Time Temp Pulse Resp BP MAP (mmHg) SpO2 O2 Device Patient Position - Orthostatic VS   11/09/22 08:58:18 -- 90 -- 118/80 93 98 % -- --   11/09/22 0856 -- -- -- 118/80 -- -- -- --   11/09/22 08:19:18 97 9 °F (36 6 °C) 90 -- 116/80 92 98 % -- --   11/08/22 2100 -- -- -- -- -- -- None (Room air) --   11/08/22 20:34:08 98 6 °F (37 °C) 89 16 159/110 Abnormal  126 -- -- --   11/08/22 2000 -- 88 20 120/58 84 97 % -- --   11/08/22 1930 -- 87 20 125/60 86 97 % None (Room air) --   11/08/22 1900 -- 90 20 116/62 81 98 % None (Room air) --   11/08/22 1815 -- 85 20 157/92 119 99 % None (Room air) --   11/08/22 1737 -- 91 16 144/70 -- 100 % None (Room air) --   11/08/22 1618 99 1 °F (37 3 °C) 99 20 145/86 -- 96 % None (Room air) Lying       Pertinent Labs/Diagnostic Test Results:   EGD 11/9:  IMPRESSION:  LA class C esophagitis  5 cm hiatal hernia without Steve's erosions otherwise normal appearing stomach direct and retroflexed views; random gastric biopsies taken to rule out H pylori  Mild duodenitis in duodenal bulb  Normal appearing visualized 2nd portion duodenum      CT abdomen pelvis with contrast   Final Result by Ashli Jeffery MD (11/08 1900)      No acute inflammatory changes in the abdomen or the pelvis  XR chest 1 view portable   Final Result by Luis Seaman MD (11/09 1292)      No acute cardiopulmonary disease              Results from last 7 days   Lab Units 11/09/22  0802 11/09/22  0018 11/08/22  1638   WBC Thousand/uL  --   --  9 18   HEMOGLOBIN g/dL 12 8 11 3* 13 3   HEMATOCRIT %  --   --  40 9   PLATELETS Thousands/uL  --   --  330   NEUTROS ABS Thousands/µL  --   --  4 76     Results from last 7 days   Lab Units 11/08/22  1638   SODIUM mmol/L 136   POTASSIUM mmol/L 4 1   CHLORIDE mmol/L 103   CO2 mmol/L 27   ANION GAP mmol/L 6   BUN mg/dL 18   CREATININE mg/dL 1 28   EGFR ml/min/1 73sq m 67   CALCIUM mg/dL 8 2*   MAGNESIUM mg/dL 1 8     Results from last 7 days   Lab Units 11/08/22  1638   AST U/L 28   ALT U/L 49   ALK PHOS U/L 95   TOTAL PROTEIN g/dL 7 4   ALBUMIN g/dL 3 8   TOTAL BILIRUBIN mg/dL 0 24     Results from last 7 days   Lab Units 11/08/22  1638   GLUCOSE RANDOM mg/dL 100     Results from last 7 days   Lab Units 11/08/22  1638   PROTIME seconds 13 2   INR  0 99   PTT seconds 25     Results from last 7 days   Lab Units 11/08/22  1638   LIPASE u/L 138     Results from last 7 days   Lab Units 11/09/22  0019   CLARITY UA  Clear   COLOR UA  Yellow   SPEC GRAV UA  1 010   PH UA  5 5   GLUCOSE UA mg/dl Negative   KETONES UA mg/dl Negative   BLOOD UA  Negative   PROTEIN UA mg/dl Negative   NITRITE UA  Negative   BILIRUBIN UA  Negative   UROBILINOGEN UA E U /dl 0 2   LEUKOCYTES UA  Negative       ED Treatment:   Medication Administration from 11/08/2022 1609 to 11/08/2022 2031       Date/Time Order Dose Route Action     11/08/2022 1638 sodium chloride 0 9 % bolus 1,000 mL 1,000 mL Intravenous New Bag     11/08/2022 1647 pantoprazole (PROTONIX) 80 mg in sodium chloride 0 9 % 100 mL IVPB 80 mg Intravenous New Bag     11/08/2022 1648 pantoprazole (PROTONIX) 80 mg in sodium chloride 0 9 % 100 mL infusion 8 mg/hr Intravenous New Bag     11/08/2022 1653 acetaminophen (TYLENOL) tablet 975 mg 975 mg Oral Given     11/08/2022 1755 oxyCODONE (ROXICODONE) IR tablet 5 mg 5 mg Oral Given     Past Medical History:   Diagnosis Date   • Hypertension    • Psychiatric disorder        Admitting Diagnosis: Melena [K92 1]  Bloody stool [K92 1]  Age/Sex: 41 y o  male  Admission Orders:  Scheduled Medications:  amoxicillin, 500 mg, Oral, Q8H [address]  buPROPion, 300 mg, Oral, Daily  escitalopram, 10 mg, Oral, Daily  lisinopril, 20 mg, Oral, Daily    Continuous IV Infusions:  pantoprozole (PROTONIX) infusion (Continuous), 8 mg/hr, Intravenous, Continuous    PRN Meds:  acetaminophen, 650 mg, Oral, Q6H PRN 11/9 x1  ondansetron, 4 mg, Intravenous, Q6H PRN  traMADol, 50 mg, Oral, Q6H PRN 11/8 x1, 11/9 x1          Network Utilization Review Department  ATTENTION: Please call with any questions or concerns to [phone] and carefully listen to the prompts so that you are directed to the right person  All voicemails are confidential   Vicente Silva all requests for admission clinical reviews, approved or denied determinations and any other requests to dedicated fax number below belonging to the campus where the patient is receiving treatment   List of dedicated fax numbers for the Facilities:  [address] DENIALS (Administrative/Medical Necessity) [phone]   [address] (Maternity/NICU/Pediatrics) [phone]   [address] [phone]   [facility] [phone]   [facility] [phone]   [facility] [address] [phone]   [facility] [address] [place] [address] [phone]

## 2022-11-09 NOTE — PROGRESS NOTES
Patient verbally upset he chewed his dinner with the tooth that has been causing him discomfort and he had a lot of pain then  PRN ultram and scheduled lyrica given for discomfort

## 2022-11-09 NOTE — ASSESSMENT & PLAN NOTE
· Left lower for some time he has an appointment with dentist on Thursday he currently got antibiotics amoxicilin 500 mg p o  T i d    · PRN tramadol for pain  · PDMP reviewed filler tylenol w/codine 11/6  · Dentist appointment scheduled 1030am 11/10- will discharge tomorrow AM with no ride today and unable to drive self with EGD sedation

## 2022-11-09 NOTE — PROGRESS NOTES
Assumed care for the patient in the morning, he was in bed and reported having cavity pain in his tooth rating a 5/10   CARLOS ENRIQUE Perales  [employer]  [place] Esposito DNP, RN

## 2022-11-09 NOTE — ASSESSMENT & PLAN NOTE
· Patient has a history of ulcer many years ago  Has been taking naproxen twice a day for some time  Developed upper abdominal pain had a bowel movement today with melena  Hemoglobin is stable  Start clears NPO at midnight Protonix drip    Gastroenterology for EGD   · H&H q 8 hours hemodynamically stable

## 2022-11-09 NOTE — CONSULTS
Consultation - [facility]  Gina Rao 41 y o  male MRN: [id number]  Unit/Bed#: -01 Encounter: [id number]        Inpatient consult to gastroenterology  Consult performed by: JOSH Cartagena  Consult ordered by: Rosemary Harding MD          Reason for Consult / Principal Problem:   Hx of ulcer  NSAID use  Melena  Acute blood loss anemia  syncope  Colonic diverticulosis  Average risk for colon cancer      ASSESSMENT AND PLAN:    Hx of ulcer  NSAID use  Melena  Acute blood loss anemia  syncope  Colonic diverticulosis  Average risk for colon cancer    2 days of lightheadedness, dizziness, and 2 episodes of syncope with 1 episode of melena and burning epigastric with previous hx of "ulcers and polyps, and thin esophagus" on previous EGD 5 years ago with daily NSAID use due to toothache    Records unavailable at this time for review    hgb 13 3 on admission, now 11 3 today, but upon chart review baseline appears to be 11-13 for past 2 years    Normal BUN    Rectal exam today with soft brown stool with black specks    Keep npo     Transfuse to keep hgb >7    Cont protonix drip    EGD today    Outpatient colonoscopy        [facility]  Gastroenterology    Patient plan of care formulated with Dr Renay Cohn     ______________________________________________________________________    HPI:    Patient is a 41year old male with PMH for HTN, MDD, dental issues, who presents with 2 days of lightheadedness, dizziness, and 2 episodes of syncope with 1 episode of melena and burning epigastric with previous hx of "ulcers and polyps, and thin esophagus" on previous EGD 5 years ago with daily NSAID use due to toothache  He continues with dizziness today unfortunately, VSS  Rectal exam today with soft brown stool and black specks  Denies any n/v/weight loss/dysphagia  Says he had 1 week of early satiety prior to this episode      Bowel movements were normal until 1 episode of melena  Previous EGD-[facility] in [facility]-"ulcers and polyps, and thin esophagus"    Previous colonoscopy denies, no colon cancer screening of any kind    Previous abdominal surgeries-hernia repair as a child    Pertinent labs: hgb 13 3 on admission, down to 11 3 today, bun WNL    Current pertinent medications: home med of prilosec 40mg daily and daily NSAID use of naproxen for dental pain     Current pertinent radiology studies: CT abd/pelvis with colonic diverticulosis    Not currently on antiplatelet or anticoagulation therapy or at home    Tobacco use-denies    ETOH use-denies    Drug use-denies    Family history for   Colon cancer-denies  Pancreatic cancer-denies  Gastric cancer-denies  Esophogeal cancer-denies  Liver disease-denies  Bowel disorders-denies          REVIEW OF SYSTEMS:    Review of Systems   Constitutional: Positive for appetite change  Negative for unexpected weight change  HENT: Negative for trouble swallowing  Respiratory: Positive for shortness of breath  Cardiovascular: Negative for chest pain  Gastrointestinal: Positive for abdominal pain and blood in stool  Negative for abdominal distention, anal bleeding, constipation, diarrhea, nausea, rectal pain and vomiting  Neurological: Positive for dizziness, syncope and light-headedness  All other systems reviewed and are negative  Historical Information   Past Medical History:   Diagnosis Date   • Hypertension    • Psychiatric disorder      Past Surgical History:   Procedure Laterality Date   • HERNIA REPAIR       Social History   Social History     Substance and Sexual Activity   Alcohol Use Not Currently     Social History     Substance and Sexual Activity   Drug Use Not Currently     Social History     Tobacco Use   Smoking Status Never Smoker   Smokeless Tobacco Never Used     History reviewed  No pertinent family history      Meds/Allergies     Medications Prior to Admission   Medication   • buPROPion (WELLBUTRIN XL) 300 mg 24 hr tablet   • escitalopram (LEXAPRO) 10 mg tablet   • traMADol (ULTRAM) 50 mg tablet     Current Facility-Administered Medications   Medication Dose Route Frequency   • acetaminophen (TYLENOL) tablet 650 mg  650 mg Oral Q6H PRN   • amoxicillin (AMOXIL) capsule 500 mg  500 mg Oral Q8H [address]   • buPROPion (WELLBUTRIN XL) 24 hr tablet 300 mg  300 mg Oral Daily   • escitalopram (LEXAPRO) tablet 10 mg  10 mg Oral Daily   • lisinopril (ZESTRIL) tablet 20 mg  20 mg Oral Daily   • ondansetron (ZOFRAN) injection 4 mg  4 mg Intravenous Q6H PRN   • pantoprazole (PROTONIX) 80 mg in sodium chloride 0 9 % 100 mL infusion  8 mg/hr Intravenous Continuous   • traMADol (ULTRAM) tablet 50 mg  50 mg Oral Q6H PRN       No Known Allergies        Objective     Blood pressure (!) 159/110, pulse 89, temperature 98 6 °F (37 °C), resp  rate 16, weight 116 kg (255 lb 12 8 oz), SpO2 97 %  There is no height or weight on file to calculate BMI  Intake/Output Summary (Last 24 hours) at 11/9/2022 0803  Last data filed at 11/8/2022 2259  Gross per 24 hour   Intake 641 83 ml   Output 200 ml   Net 441 83 ml         PHYSICAL EXAM:      Physical Exam  Vitals and nursing note reviewed  Exam conducted with a chaperone present ([employer] nursing students X2)  Constitutional:       General: He is not in acute distress  Appearance: Normal appearance  He is not ill-appearing, toxic-appearing or diaphoretic  HENT:      Head: Normocephalic and atraumatic  Mouth/Throat:      Mouth: Mucous membranes are dry  Pharynx: Oropharynx is clear  Eyes:      General: No scleral icterus  Conjunctiva/sclera: Conjunctivae normal       Pupils: Pupils are equal, round, and reactive to light  Cardiovascular:      Rate and Rhythm: Normal rate  Pulmonary:      Effort: Pulmonary effort is normal  No respiratory distress  Abdominal:      General: There is no distension  Palpations: Abdomen is soft  There is no mass  Tenderness: There is no abdominal tenderness  There is no guarding or rebound  Hernia: No hernia is present  Skin:     General: Skin is warm and dry  Coloration: Skin is not jaundiced or pale  Findings: No rash  Neurological:      General: No focal deficit present  Mental Status: He is alert and oriented to person, place, and time     Psychiatric:         Mood and Affect: Mood normal          Behavior: Behavior normal              Lab Results:   Admission on 11/08/2022   Component Date Value   • WBC 11/08/2022 9 18    • RBC 11/08/2022 4 84    • Hemoglobin 11/08/2022 13 3    • Hematocrit 11/08/2022 40 9    • MCV 11/08/2022 85    • MCH 11/08/2022 27 5    • MCHC 11/08/2022 32 5    • RDW 11/08/2022 13 7    • MPV 11/08/2022 9 0    • Platelets 22/78/4286 330    • nRBC 11/08/2022 0    • Neutrophils Relative 11/08/2022 52    • Immat GRANS % 11/08/2022 0    • Lymphocytes Relative 11/08/2022 36    • Monocytes Relative 11/08/2022 8    • Eosinophils Relative 11/08/2022 3    • Basophils Relative 11/08/2022 1    • Neutrophils Absolute 11/08/2022 4 76    • Immature Grans Absolute 11/08/2022 0 03    • Lymphocytes Absolute 11/08/2022 3 33    • Monocytes Absolute 11/08/2022 0 71    • Eosinophils Absolute 11/08/2022 0 30    • Basophils Absolute 11/08/2022 0 05    • Sodium 11/08/2022 136    • Potassium 11/08/2022 4 1    • Chloride 11/08/2022 103    • CO2 11/08/2022 27    • ANION GAP 11/08/2022 6    • BUN 11/08/2022 18    • Creatinine 11/08/2022 1 28    • Glucose 11/08/2022 100    • Calcium 11/08/2022 8 2 (A)   • AST 11/08/2022 28    • ALT 11/08/2022 49    • Alkaline Phosphatase 11/08/2022 95    • Total Protein 11/08/2022 7 4    • Albumin 11/08/2022 3 8    • Total Bilirubin 11/08/2022 0 24    • eGFR 11/08/2022 67    • Magnesium 11/08/2022 1 8    • Lipase 11/08/2022 138    • Protime 11/08/2022 13 2    • INR 11/08/2022 0 99    • PTT 11/08/2022 25    • Color, UA 11/09/2022 Yellow    • Clarity, UA 11/09/2022 Clear • Specific [place], UA 11/09/2022 1 010    • pH, UA 11/09/2022 5 5    • Leukocytes, UA 11/09/2022 Negative    • Nitrite, UA 11/09/2022 Negative    • Protein, UA 11/09/2022 Negative    • Glucose, UA 11/09/2022 Negative    • Ketones, UA 11/09/2022 Negative    • Urobilinogen, UA 11/09/2022 0 2    • Bilirubin, UA 11/09/2022 Negative    • Occult Blood, UA 11/09/2022 Negative    • Hemoglobin 11/09/2022 11 3 (A)       Imaging Studies: I have personally reviewed pertinent imaging studies  CT abd pelvis with contrast 11/08/2022:  CT ABDOMEN AND PELVIS WITH IV CONTRAST     INDICATION:   Melena  melena  "Patient noticed blood in stool today  Patient stated yesterday he felt weak, had a syncopal episode and felt sob  Today patient feels the same"      COMPARISON:  None      TECHNIQUE:  CT examination of the abdomen and pelvis was performed  Axial, sagittal, and coronal 2D reformatted images were created from the source data and submitted for interpretation      Radiation dose length product (DLP) for this visit:  1072 mGy-cm   This examination, like all CT scans performed in the [facility], was performed utilizing techniques to minimize radiation dose exposure, including the use of iterative   reconstruction and automated exposure control      IV Contrast:  100 mL of iohexol (OMNIPAQUE)  Enteric Contrast:  Enteric contrast was not administered      FINDINGS:     ABDOMEN     LOWER CHEST:  Small sliding-type hiatal hernia      LIVER/BILIARY TREE:  Hepatomegaly and hepatic steatosis      GALLBLADDER:  No calcified gallstones  No pericholecystic inflammatory change      SPLEEN:  Unremarkable      PANCREAS:  Unremarkable      ADRENAL GLANDS:  Unremarkable      KIDNEYS/URETERS:  Unremarkable   No hydronephrosis      STOMACH AND BOWEL:  There is colonic diverticulosis without evidence of acute diverticulitis      No acute bowel findings      APPENDIX:  No findings to suggest appendicitis      ABDOMINOPELVIC CAVITY:  No ascites  No pneumoperitoneum  No lymphadenopathy      VESSELS:  Unremarkable for patient's age      PELVIS     REPRODUCTIVE ORGANS:  Unremarkable for patient's age      URINARY BLADDER:  Unremarkable      ABDOMINAL WALL/INGUINAL REGIONS:  Unremarkable      OSSEOUS STRUCTURES:  No acute fracture or destructive osseous lesion      IMPRESSION:     No acute inflammatory changes in the abdomen or the pelvis

## 2022-11-09 NOTE — ANESTHESIA PREPROCEDURE EVALUATION
Procedure:  EGD    Relevant Problems   CARDIO   (+) Hypertension      NEURO/PSYCH   (+) MDD (major depressive disorder)      Other   (+) Obesity (BMI 30-39  9)        Physical Exam    Airway    Mallampati score: II  TM Distance: >3 FB  Neck ROM: full     Dental   No notable dental hx     Cardiovascular      Pulmonary      Other Findings        Anesthesia Plan  ASA Score- 2     Anesthesia Type- IV sedation with anesthesia with ASA Monitors  Additional Monitors:   Airway Plan:     Comment: Back up GA  Plan Factors-Exercise tolerance (METS): >4 METS  Chart reviewed  Existing labs reviewed  Patient summary reviewed  Patient is not a current smoker  Induction-     Postoperative Plan-     Informed Consent- Anesthetic plan and risks discussed with patient  I personally reviewed this patient with the CRNA  Discussed and agreed on the Anesthesia Plan with the CRNA  Mari Rodriguez

## 2022-11-09 NOTE — ASSESSMENT & PLAN NOTE
· Left lower for some time he has an appointment with dentist on Thursday he currently got antibiotics amoxicilin 500 mg p o  T i d  also will give some tramadol for pain

## 2022-11-09 NOTE — UTILIZATION REVIEW
NOTIFICATION OF INPATIENT ADMISSION   AUTHORIZATION REQUEST   SERVICING FACILITY:   [address] [place] [address]  Scooter Cedillo [address] [place] [address], [address]  Tax ID: [id number]  NPI: [id number] ATTENDING PROVIDER:  Attending Name and NPI#: Naila Garcia [place], [place] [[id number]]  Address: [facility]  Scooter Schmidbrock [address] [place] [address], [address]  Phone: [phone]   ADMISSION INFORMATION:  Place of Service: [place] [address]  Place of Service Code: 21  Inpatient Admission Date/Time: 11/8/22  7:17 PM  Discharge Date/Time: No discharge date for patient encounter  Admitting Diagnosis Code/Description:  Melena [K92 1]  Bloody stool [K92 1]     UTILIZATION REVIEW CONTACT:  Gianna Ruiz Utilization   Network Utilization Review Department  Phone: [phone]  Fax [phone]  Email: [email or web address]  Contact for approvals/pending authorizations, clinical reviews, and discharge  PHYSICIAN ADVISORY SERVICES:  Medical Necessity Denial & [employer] Review  Phone: [phone]  Fax: [phone]  Email: [email or web address]

## 2022-11-10 VITALS
OXYGEN SATURATION: 96 % | WEIGHT: 256 LBS | RESPIRATION RATE: 16 BRPM | DIASTOLIC BLOOD PRESSURE: 64 MMHG | HEART RATE: 91 BPM | SYSTOLIC BLOOD PRESSURE: 108 MMHG | HEIGHT: 70 IN | BODY MASS INDEX: 36.65 KG/M2 | TEMPERATURE: 97.9 F

## 2022-11-10 LAB
ERYTHROCYTE [DISTWIDTH] IN BLOOD BY AUTOMATED COUNT: 14.1 % (ref 11.6–15.1)
HCT VFR BLD AUTO: 35.8 % (ref 36.5–49.3)
HGB BLD-MCNC: 11.4 G/DL (ref 12–17)
MCH RBC QN AUTO: 27.4 PG (ref 26.8–34.3)
MCHC RBC AUTO-ENTMCNC: 31.8 G/DL (ref 31.4–37.4)
MCV RBC AUTO: 86 FL (ref 82–98)
PLATELET # BLD AUTO: 274 THOUSANDS/UL (ref 149–390)
PMV BLD AUTO: 8.7 FL (ref 8.9–12.7)
RBC # BLD AUTO: 4.16 MILLION/UL (ref 3.88–5.62)
WBC # BLD AUTO: 6.52 THOUSAND/UL (ref 4.31–10.16)

## 2022-11-10 RX ORDER — AMOXICILLIN 500 MG/1
500 CAPSULE ORAL EVERY 8 HOURS SCHEDULED
Qty: 16 CAPSULE | Refills: 0 | Status: SHIPPED | OUTPATIENT
Start: 2022-11-10 | End: 2022-11-16

## 2022-11-10 RX ORDER — LISINOPRIL 20 MG/1
20 TABLET ORAL DAILY
Qty: 30 TABLET | Refills: 0 | Status: SHIPPED | OUTPATIENT
Start: 2022-11-10 | End: 2022-12-10

## 2022-11-10 RX ORDER — PANTOPRAZOLE SODIUM 40 MG/1
40 TABLET, DELAYED RELEASE ORAL
Qty: 112 TABLET | Refills: 0 | Status: SHIPPED | OUTPATIENT
Start: 2022-11-10 | End: 2023-01-05

## 2022-11-10 RX ORDER — PANTOPRAZOLE SODIUM 40 MG/1
40 TABLET, DELAYED RELEASE ORAL
Status: DISCONTINUED | OUTPATIENT
Start: 2022-11-10 | End: 2022-11-10 | Stop reason: HOSPADM

## 2022-11-10 RX ADMIN — BUPROPION HYDROCHLORIDE 300 MG: 150 TABLET, EXTENDED RELEASE ORAL at 08:31

## 2022-11-10 RX ADMIN — PREGABALIN 75 MG: 75 CAPSULE ORAL at 08:31

## 2022-11-10 RX ADMIN — MELATONIN 6 MG: 3 TAB ORAL at 00:02

## 2022-11-10 RX ADMIN — TRAMADOL HYDROCHLORIDE 50 MG: 50 TABLET ORAL at 00:02

## 2022-11-10 RX ADMIN — PANTOPRAZOLE SODIUM 40 MG: 40 TABLET, DELAYED RELEASE ORAL at 06:25

## 2022-11-10 RX ADMIN — SODIUM CHLORIDE 8 MG/HR: 900 INJECTION, SOLUTION INTRAVENOUS at 00:00

## 2022-11-10 RX ADMIN — AMOXICILLIN 500 MG: 250 CAPSULE ORAL at 06:26

## 2022-11-10 RX ADMIN — ESCITALOPRAM OXALATE 10 MG: 10 TABLET ORAL at 08:30

## 2022-11-10 RX ADMIN — TRAMADOL HYDROCHLORIDE 50 MG: 50 TABLET ORAL at 06:25

## 2022-11-10 NOTE — NURSING NOTE
Patient refusing to maintain IV access stating it was bothering him and "its my right to have it out and I want it out  I'm leaving in the morning anyways so what's the difference" Patient educated on the continuous medications ordered by the care team; however, patient continuous to want the IV removed  IV removed, no bleeding noted  PO intake encouraged  Ghazala Bowser made aware

## 2022-11-10 NOTE — DISCHARGE SUMMARY
[address]  Discharge- Zonia Joseph 1975, 41 y o  male MRN: [id number]  Unit/Bed#: -01 Encounter: [id number]  Primary Care Provider: Koki Haddad MD   Date and time admitted to hospital: 11/8/2022  4:14 PM    * Acute upper GI bleeding  Assessment & Plan  · Patient has a history of ulcer many years ago  Has been taking naproxen twice a day for some time  Developed upper abdominal pain had a bowel movement today with melena, syncopal episode in pharmacy and ER  · NPO,  Protonix drip  · Gastroenterology consulted; input as noted below   · S/P EGD on 11/9  · Repeat EGD in 3 months   · High-dose PPI BID X8 weeks then decrease to Qd  · Hemoglobin is stable 12, baseline in Care everywhere 11-13     Toothache  Assessment & Plan  · Left lower for some time he has an appointment with dentist on Thursday he currently got antibiotics amoxicilin 500 mg PO TID   · PRN tramadol for pain  · PDMP reviewed filler tylenol w/codine 11/6  · Dentist appointment scheduled 1030am 11/10    Obesity (BMI 30-39  9)  Assessment & Plan  · Lifestyle modifications     Hypertension  Assessment & Plan  · Resume lisinopril controlled  · Consider holding with BP 110s this AM and x2 syncopal episodes     MDD (major depressive disorder)  Assessment & Plan  · Continue Wellbutrin Lexapro      Discharging Physician / Practitioner: Vira Manzanares  PCP: Koki Haddad MD  Admission Date:   Admission Orders (From admission, onward)     Ordered        11/08/22 [address]  [facility]  Once                      Discharge Date: 11/10/22    Medical Problems             Resolved Problems  Date Reviewed: 11/10/2022   None                 Consultations During Hospital Stay:  · IP CONSULT TO GASTROENTEROLOGY    Procedures Performed:   EGD Result Date: 11/9/2022  · Narrative: [facility] [address] [address] [phone] DATE OF SERVICE: 11/09/22 PHYSICIAN(S): Attending: Farooq Carpenter MD Fellow: No Staff Documented INDICATION: Melena, Acute upper GI bleeding POST-OP DIAGNOSIS: See the impression below  PREPROCEDURE: Informed consent was obtained for the procedure, including sedation  Risks of perforation, hemorrhage, adverse drug reaction and aspiration were discussed  The patient was placed in the left lateral decubitus position  Patient was explained about the risks and benefits of the procedure  Risks including but not limited to bleeding, infection, and perforation were explained in detail  Also explained about less than 100% sensitivity with the exam and other alternatives  DETAILS OF PROCEDURE: Patient was taken to the procedure room where a time out was performed to confirm correct patient and correct procedure  The patient underwent monitored anesthesia care, which was administered by an anesthesia professional  The patient's blood pressure, heart rate, level of consciousness, respirations, oxygen, ECG and ETCO2 were monitored throughout the procedure  The scope was introduced through the mouth and advanced to the second part of the duodenum  Retroflexion was performed in the incisura  The patient experienced no blood loss  The procedure was not difficult  The patient tolerated the procedure well  There were no apparent complications  ANESTHESIA INFORMATION: ASA: II Anesthesia Type: IV Sedation with Anesthesia MEDICATIONS: No administrations occurring from 1017 to 1036 on 11/09/22 FINDINGS: Erythematous mucosa with erosion in the GE junction   LA class C esophagitis 5 cm sliding hiatal hernia (type I hiatal hernia) without Imogene Pounds lesions present - GE junction 35 cm from the incisors, diaphragmatic impression 40 cm from the incisors Performed forceps biopsies to rule out H  pylori in the stomach Erythematous mucosa in the duodenal bulb The 2nd part of the duodenum appeared normal  SPECIMENS: ID Type Source Tests Collected by Time Destination 1 : rule out h pylori Tissue Stomach TISSUE EXAM Amadeo Farrar MD 11/9/2022 10:30 AM  Impression: LA class C esophagitis 5 cm hiatal hernia without Steve's erosions otherwise normal appearing stomach direct and retroflexed views; random gastric biopsies taken to rule out H pylori Mild duodenitis in duodenal bulb Normal appearing visualized 2nd portion duodenum RECOMMENDATION: Await pathology results High-dose PPI for 2 months then reduce to once daily May resume regular diet Repeat EGD in 3 months Consider alternative etiologies for presyncope as there was not significant drop in his hemoglobin or changes in his vital signs Return to floor and resume care as per primary team Outpatient GI follow-up GI will sign off at this time  Please call with any further questions or concerns  Amadeo Farrar MD   · XR chest 1 view portable Result Date: 11/9/2022  · Narrative: CHEST INDICATION:   melena  COMPARISON:  None EXAM PERFORMED/VIEWS:  XR CHEST PORTABLE FINDINGS: Cardiomediastinal silhouette appears unremarkable  The lungs are clear  No pneumothorax or pleural effusion  Osseous structures appear within normal limits for patient age  Impression: No acute cardiopulmonary disease  Workstation performed: [id number]   · CT abdomen pelvis with contrast Result Date: 11/8/2022  · Narrative: CT ABDOMEN AND PELVIS WITH IV CONTRAST INDICATION:   Melena melena  "Patient noticed blood in stool today  Patient stated yesterday he felt weak, had a syncopal episode and felt sob  Today patient feels the same" COMPARISON:  None  TECHNIQUE:  CT examination of the abdomen and pelvis was performed  Axial, sagittal, and coronal 2D reformatted images were created from the source data and submitted for interpretation  Radiation dose length product (DLP) for this visit:  1072 mGy-cm     This examination, like all CT scans performed in the [facility], was performed utilizing techniques to minimize radiation dose exposure, including the use of iterative reconstruction and automated exposure control  IV Contrast:  100 mL of iohexol (OMNIPAQUE) Enteric Contrast:  Enteric contrast was not administered  FINDINGS: ABDOMEN LOWER CHEST:  Small sliding-type hiatal hernia  LIVER/BILIARY TREE:  Hepatomegaly and hepatic steatosis  GALLBLADDER:  No calcified gallstones  No pericholecystic inflammatory change  SPLEEN:  Unremarkable  PANCREAS:  Unremarkable  ADRENAL GLANDS:  Unremarkable  KIDNEYS/URETERS:  Unremarkable  No hydronephrosis  STOMACH AND BOWEL:  There is colonic diverticulosis without evidence of acute diverticulitis  No acute bowel findings  APPENDIX:  No findings to suggest appendicitis  ABDOMINOPELVIC CAVITY:  No ascites  No pneumoperitoneum  No lymphadenopathy  VESSELS:  Unremarkable for patient's age  PELVIS REPRODUCTIVE ORGANS:  Unremarkable for patient's age  URINARY BLADDER:  Unremarkable  ABDOMINAL WALL/INGUINAL REGIONS:  Unremarkable  OSSEOUS STRUCTURES:  No acute fracture or destructive osseous lesion  Impression: No acute inflammatory changes in the abdomen or the pelvis  Workstation performed: [id number]     Significant Findings / Test Results:   · As noted above     Incidental Findings:   · None      Test Results Pending at Discharge (will require follow up): · None      Outpatient Tests Requested:  · Per GI, repeat EGD in 3 months     Complications:  None     Past Medical History:   Diagnosis Date   • Hypertension    • Psychiatric disorder    • Ulcer, stomal        Reason for Admission: Rectal Bleeding (Patient noticed blood in stool today  Patient stated yesterday he felt weak, had a syncopal episode and felt sob  Today patient feels the same  )       Hospital Course:     Dolores Rivera is a 41 y o  male patient with past medical history as noted in table above who originally presented to the hospital on 11/8/2022 due to Rectal Bleeding (Patient noticed blood in stool today   Patient stated yesterday he felt weak, had a syncopal episode and felt sob  Today patient feels the same  )     Patient presented to the ED status post episode of melena associated with epigastric pain  Underwent EGD on 11/09 with findings as noted above  Of note he had been taking naproxen b i d  for a toothache for which he does have an appointment today on day of discharge 11/10  Hemoglobin remained stable  GI signed off  Can follow up outpatient with GI and PCP for further ongoing management and surveillance  Please see above list of diagnoses and related plan for additional information  Condition at Discharge: stable     Discharge Day Visit / Exam:     Subjective:  Patient denies any belly pain  Does endorse mild tooth pain for which he would lke to be discharge to get the tooth extracted today   Vitals: Blood Pressure: 108/64 (11/10/22 0644)  Pulse: 91 (11/10/22 0644)  Temperature: 97 9 °F (36 6 °C) (11/10/22 0644)  Temp Source: Oral (11/09/22 0952)  Respirations: 16 (11/10/22 0644)  Height: 5' 10" (177 8 cm) (11/09/22 5097)  Weight - Scale: 116 kg (256 lb) (11/09/22 0952)  SpO2: 96 % (11/10/22 0644)  Exam:   Physical Exam  Constitutional:       Appearance: Normal appearance  He is not ill-appearing  Cardiovascular:      Rate and Rhythm: Normal rate and regular rhythm  Pulses: Normal pulses  Heart sounds: No murmur heard  Pulmonary:      Effort: Pulmonary effort is normal       Breath sounds: Normal breath sounds  Abdominal:      General: Bowel sounds are normal  There is no distension  Palpations: Abdomen is soft  Tenderness: There is no abdominal tenderness  Musculoskeletal:         General: No swelling  Skin:     General: Skin is warm and dry  Capillary Refill: Capillary refill takes less than 2 seconds  Neurological:      Mental Status: He is alert and oriented to person, place, and time     Psychiatric:         Mood and Affect: Mood normal          Behavior: Behavior normal          Discussion with Family: declined however I did offer     Discharge instructions/Information to patient and family:   See after visit summary for information provided to patient and family  Provisions for Follow-Up Care:  See after visit summary for information related to follow-up care and any pertinent home health orders  Disposition:     Home    Discharge Statement:  I spent 45 minutes discharging the patient  This time was spent on the day of discharge  I had direct contact with the patient on the day of discharge  Greater than 50% of the total time was spent examining patient, answering all patient questions, arranging and discussing plan of care with patient as well as directly providing post-discharge instructions  Additional time then spent on discharge activities  Discharge Medications:  See after visit summary for reconciled discharge medications provided to patient and family        ** Please Note: This note has been constructed using a voice recognition system **

## 2022-11-10 NOTE — ASSESSMENT & PLAN NOTE
· Left lower for some time he has an appointment with dentist on Thursday he currently got antibiotics amoxicilin 500 mg PO TID   · PRN tramadol for pain  · PDMP reviewed filler tylenol w/codine 11/6  · Dentist appointment scheduled 1030am 11/10

## 2022-11-10 NOTE — NURSING NOTE
reviewed discharge instructions, med rec, follow up appointments, worsening s/s when to call PCP or return to ER verbally understood

## 2022-11-10 NOTE — PLAN OF CARE
Problem: PAIN - ADULT  Goal: Verbalizes/displays adequate comfort level or baseline comfort level  Description: Interventions:  - Encourage patient to monitor pain and request assistance  - Assess pain using appropriate pain scale  - Administer analgesics based on type and severity of pain and evaluate response  - Implement non-pharmacological measures as appropriate and evaluate response  - Consider cultural and social influences on pain and pain management  - Notify physician/advanced practitioner if interventions unsuccessful or patient reports new pain  Outcome: Progressing     Problem: INFECTION - ADULT  Goal: Absence or prevention of progression during hospitalization  Description: INTERVENTIONS:  - Assess and monitor for signs and symptoms of infection  - Monitor lab/diagnostic results  - Monitor all insertion sites, i e  indwelling lines, tubes, and drains  - Monitor endotracheal if appropriate and nasal secretions for changes in amount and color  - Charlton appropriate cooling/warming therapies per order  - Administer medications as ordered  - Instruct and encourage patient and family to use good hand hygiene technique  - Identify and instruct in appropriate isolation precautions for identified infection/condition  Outcome: Progressing  Goal: Absence of fever/infection during neutropenic period  Description: INTERVENTIONS:  - Monitor WBC    Outcome: Progressing     Problem: SAFETY ADULT  Goal: Patient will remain free of falls  Description: INTERVENTIONS:  - Educate patient/family on patient safety including physical limitations  - Instruct patient to call for assistance with activity   - Consult OT/PT to assist with strengthening/mobility   - Keep Call bell within reach  - Keep bed low and locked with side rails adjusted as appropriate  - Keep care items and personal belongings within reach  - Initiate and maintain comfort rounds  - Make Fall Risk Sign visible to staff  - Apply yellow socks and bracelet for high fall risk patients  - Consider moving patient to room near nurses station  Outcome: Progressing  Goal: Maintain or return to baseline ADL function  Description: INTERVENTIONS:  -  Assess patient's ability to carry out ADLs; assess patient's baseline for ADL function and identify physical deficits which impact ability to perform ADLs (bathing, care of mouth/teeth, toileting, grooming, dressing, etc )  - Assess/evaluate cause of self-care deficits   - Assess range of motion  - Assess patient's mobility; develop plan if impaired  - Assess patient's need for assistive devices and provide as appropriate  - Encourage maximum independence but intervene and supervise when necessary  - Involve family in performance of ADLs  - Assess for home care needs following discharge   - Consider OT consult to assist with ADL evaluation and planning for discharge  - Provide patient education as appropriate  Outcome: Progressing  Goal: Maintains/Returns to pre admission functional level  Description: INTERVENTIONS:  - Perform BMAT or MOVE assessment daily    - Set and communicate daily mobility goal to care team and patient/family/caregiver     - Collaborate with rehabilitation services on mobility goals if consulted  - Out of bed for toileting  - Record patient progress and toleration of activity level   Outcome: Progressing     Problem: DISCHARGE PLANNING  Goal: Discharge to home or other facility with appropriate resources  Description: INTERVENTIONS:  - Identify barriers to discharge w/patient and caregiver  - Arrange for needed discharge resources and transportation as appropriate  - Identify discharge learning needs (meds, wound care, etc )  - Arrange for interpretive services to assist at discharge as needed  - Refer to Case Management Department for coordinating discharge planning if the patient needs post-hospital services based on physician/advanced practitioner order or complex needs related to functional status, cognitive ability, or social support system  Outcome: Progressing     Problem: Knowledge Deficit  Goal: Patient/family/caregiver demonstrates understanding of disease process, treatment plan, medications, and discharge instructions  Description: Complete learning assessment and assess knowledge base    Interventions:  - Provide teaching at level of understanding  - Provide teaching via preferred learning methods  Outcome: Progressing     Problem: GASTROINTESTINAL - ADULT  Goal: Maintains or returns to baseline bowel function  Description: INTERVENTIONS:  - Assess bowel function  - Encourage oral fluids to ensure adequate hydration  - Administer IV fluids if ordered to ensure adequate hydration  - Administer ordered medications as needed PPI  - Encourage mobilization and activity  - Consider nutritional services referral to assist patient with adequate nutrition and appropriate food choices  Outcome: Progressing     Problem: Potential for Falls  Goal: Patient will remain free of falls  Description: INTERVENTIONS:  - Educate patient/family on patient safety including physical limitations  - Instruct patient to call for assistance with activity   - Consult OT/PT to assist with strengthening/mobility   - Keep Call bell within reach  - Keep bed low and locked with side rails adjusted as appropriate  - Keep care items and personal belongings within reach  - Initiate and maintain comfort rounds  - Make Fall Risk Sign visible to staff  - Apply yellow socks and bracelet for high fall risk patients  - Consider moving patient to room near nurses station  Outcome: Progressing

## 2022-11-10 NOTE — ASSESSMENT & PLAN NOTE
· Patient has a history of ulcer many years ago  Has been taking naproxen twice a day for some time  Developed upper abdominal pain had a bowel movement today with melena, syncopal episode in pharmacy and ER  · NPO,  Protonix drip      · Gastroenterology consulted; input as noted below   · S/P EGD on 11/9  · Repeat EGD in 3 months   · High-dose PPI BID X8 weeks then decrease to Qd  · Hemoglobin is stable 12, baseline in Care everywhere 11-13

## 2022-11-11 NOTE — UTILIZATION REVIEW
NOTIFICATION OF ADMISSION DISCHARGE   This is a Notification of Discharge from [address]  Please be advised that this patient has been discharge from our facility  Below you will find the admission and discharge date and time including the patient’s disposition  UTILIZATION REVIEW CONTACT:  [address] [place]  Utilization   Network Utilization Review Department  Phone: [phone] x carefully listen to the prompts  All voicemails are confidential   Email: [email or web address]     ADMISSION INFORMATION  PRESENTATION DATE: 11/8/2022  4:14 PM  OBERVATION ADMISSION DATE:  INPATIENT ADMISSION DATE: 11/8/22  7:17 PM   DISCHARGE DATE: 11/10/2022  9:21 AM  DISPOSITION: Home/Self Care Home/Self Care      IMPORTANT INFORMATION:  Send all requests for admission clinical reviews, approved or denied determinations and any other requests to dedicated fax number below belonging to the campus where the patient is receiving treatment   List of dedicated fax numbers:  [address] DENIALS (Administrative/Medical Necessity) [phone]   [address] (Maternity/NICU/Pediatrics) [phone]   [facility] [phone]   [place] [address] [phone]   [address] [phone]   [address] [facility] [phone] 90 [facility] [phone]   [address] [place] [address] 119 [phone]   [facility]  [phone]   [address] [facility] [phone] 412 [facility] 850 [facility] [phone]

## 2023-12-06 ENCOUNTER — HOSPITAL ENCOUNTER (EMERGENCY)
Facility: HOSPITAL | Age: 48
Discharge: HOME | End: 2023-12-07
Attending: EMERGENCY MEDICINE | Admitting: EMERGENCY MEDICINE
Payer: COMMERCIAL

## 2023-12-06 ENCOUNTER — APPOINTMENT (EMERGENCY)
Dept: RADIOLOGY | Facility: HOSPITAL | Age: 48
End: 2023-12-06
Payer: COMMERCIAL

## 2023-12-06 VITALS
TEMPERATURE: 97.9 F | BODY MASS INDEX: 37.99 KG/M2 | DIASTOLIC BLOOD PRESSURE: 90 MMHG | OXYGEN SATURATION: 97 % | WEIGHT: 264.77 LBS | HEART RATE: 120 BPM | SYSTOLIC BLOOD PRESSURE: 143 MMHG | RESPIRATION RATE: 18 BRPM

## 2023-12-06 DIAGNOSIS — R05.9 COUGH: Primary | ICD-10-CM

## 2023-12-06 LAB
BASOPHILS # BLD AUTO: 0.04 THOUSANDS/ÂΜL (ref 0–0.1)
BASOPHILS NFR BLD AUTO: 1 % (ref 0–1)
EOSINOPHIL # BLD AUTO: 0.31 THOUSAND/ÂΜL (ref 0–0.61)
EOSINOPHIL NFR BLD AUTO: 4 % (ref 0–6)
ERYTHROCYTE [DISTWIDTH] IN BLOOD BY AUTOMATED COUNT: 13.6 % (ref 11.6–15.1)
HCT VFR BLD AUTO: 41.3 % (ref 36.5–49.3)
HGB BLD-MCNC: 13.3 G/DL (ref 12–17)
IMM GRANULOCYTES # BLD AUTO: 0.03 THOUSAND/UL (ref 0–0.2)
IMM GRANULOCYTES NFR BLD AUTO: 0 % (ref 0–2)
LYMPHOCYTES # BLD AUTO: 2.38 THOUSANDS/ÂΜL (ref 0.6–4.47)
LYMPHOCYTES NFR BLD AUTO: 30 % (ref 14–44)
MCH RBC QN AUTO: 27.4 PG (ref 26.8–34.3)
MCHC RBC AUTO-ENTMCNC: 32.2 G/DL (ref 31.4–37.4)
MCV RBC AUTO: 85 FL (ref 82–98)
MONOCYTES # BLD AUTO: 0.78 THOUSAND/ÂΜL (ref 0.17–1.22)
MONOCYTES NFR BLD AUTO: 10 % (ref 4–12)
NEUTROPHILS # BLD AUTO: 4.32 THOUSANDS/ÂΜL (ref 1.85–7.62)
NEUTS SEG NFR BLD AUTO: 55 % (ref 43–75)
NRBC BLD AUTO-RTO: 0 /100 WBCS
PLATELET # BLD AUTO: 332 THOUSANDS/UL (ref 149–390)
PMV BLD AUTO: 8.8 FL (ref 8.9–12.7)
RBC # BLD AUTO: 4.86 MILLION/UL (ref 3.88–5.62)
WBC # BLD AUTO: 7.86 THOUSAND/UL (ref 4.31–10.16)

## 2023-12-06 PROCEDURE — 71046 X-RAY EXAM CHEST 2 VIEWS: CPT

## 2023-12-06 PROCEDURE — 99283 EMERGENCY DEPT VISIT LOW MDM: CPT

## 2023-12-06 PROCEDURE — 96374 THER/PROPH/DIAG INJ IV PUSH: CPT

## 2023-12-06 PROCEDURE — 85025 COMPLETE CBC W/AUTO DIFF WBC: CPT | Performed by: EMERGENCY MEDICINE

## 2023-12-06 PROCEDURE — 0241U HB NFCT DS VIR RESP RNA 4 TRGT: CPT | Performed by: EMERGENCY MEDICINE

## 2023-12-06 PROCEDURE — 36415 COLL VENOUS BLD VENIPUNCTURE: CPT | Performed by: EMERGENCY MEDICINE

## 2023-12-06 PROCEDURE — 96361 HYDRATE IV INFUSION ADD-ON: CPT

## 2023-12-06 PROCEDURE — 80053 COMPREHEN METABOLIC PANEL: CPT | Performed by: EMERGENCY MEDICINE

## 2023-12-06 PROCEDURE — 99284 EMERGENCY DEPT VISIT MOD MDM: CPT | Performed by: EMERGENCY MEDICINE

## 2023-12-06 RX ORDER — METHYLPREDNISOLONE SODIUM SUCCINATE 125 MG/2ML
125 INJECTION, POWDER, LYOPHILIZED, FOR SOLUTION INTRAMUSCULAR; INTRAVENOUS ONCE
Status: COMPLETED | OUTPATIENT
Start: 2023-12-06 | End: 2023-12-06

## 2023-12-06 RX ORDER — AZITHROMYCIN 250 MG/1
TABLET, FILM COATED ORAL
Qty: 6 TABLET | Refills: 0 | Status: SHIPPED | OUTPATIENT
Start: 2023-12-06 | End: 2023-12-10

## 2023-12-06 RX ORDER — METHYLPREDNISOLONE 4 MG/1
TABLET ORAL
Qty: 21 TABLET | Refills: 0 | Status: SHIPPED | OUTPATIENT
Start: 2023-12-06

## 2023-12-06 RX ORDER — IPRATROPIUM BROMIDE AND ALBUTEROL SULFATE 2.5; .5 MG/3ML; MG/3ML
3 SOLUTION RESPIRATORY (INHALATION) ONCE
Status: COMPLETED | OUTPATIENT
Start: 2023-12-06 | End: 2023-12-06

## 2023-12-06 RX ADMIN — SODIUM CHLORIDE 1000 ML: 0.9 INJECTION, SOLUTION INTRAVENOUS at 23:31

## 2023-12-06 RX ADMIN — IPRATROPIUM BROMIDE AND ALBUTEROL SULFATE 3 ML: .5; 3 SOLUTION RESPIRATORY (INHALATION) at 23:30

## 2023-12-06 RX ADMIN — METHYLPREDNISOLONE SODIUM SUCCINATE 125 MG: 125 INJECTION, POWDER, FOR SOLUTION INTRAMUSCULAR; INTRAVENOUS at 23:31

## 2023-12-07 LAB
ALBUMIN SERPL BCP-MCNC: 4.1 G/DL (ref 3.5–5)
ALP SERPL-CCNC: 65 U/L (ref 34–104)
ALT SERPL W P-5'-P-CCNC: 22 U/L (ref 7–52)
ANION GAP SERPL CALCULATED.3IONS-SCNC: 7 MMOL/L
AST SERPL W P-5'-P-CCNC: 25 U/L (ref 13–39)
BILIRUB SERPL-MCNC: 0.34 MG/DL (ref 0.2–1)
BUN SERPL-MCNC: 14 MG/DL (ref 5–25)
CALCIUM SERPL-MCNC: 8.8 MG/DL (ref 8.4–10.2)
CHLORIDE SERPL-SCNC: 106 MMOL/L (ref 96–108)
CO2 SERPL-SCNC: 24 MMOL/L (ref 21–32)
CREAT SERPL-MCNC: 1.11 MG/DL (ref 0.6–1.3)
FLUAV RNA RESP QL NAA+PROBE: NEGATIVE
FLUBV RNA RESP QL NAA+PROBE: NEGATIVE
GFR SERPL CREATININE-BSD FRML MDRD: 79 ML/MIN/1.73SQ M
GLUCOSE SERPL-MCNC: 108 MG/DL (ref 65–140)
POTASSIUM SERPL-SCNC: 4.4 MMOL/L (ref 3.5–5.3)
PROT SERPL-MCNC: 7.1 G/DL (ref 6.4–8.4)
RSV RNA RESP QL NAA+PROBE: NEGATIVE
SARS-COV-2 RNA RESP QL NAA+PROBE: NEGATIVE
SODIUM SERPL-SCNC: 137 MMOL/L (ref 135–147)

## 2023-12-07 NOTE — ED PROVIDER NOTES
History  Chief Complaint   Patient presents with    Cough     C/o cough x2 weeks. Pt was taking OTC medicine, did not make PCP appointment     Patient complains of 2 weeks of ongoing nonproductive cough. He states that prior to the cough he did have a viral type illness. At that time he did have fever congestion shortness of breath, body aches. All the symptoms have resolved but patient continues to cough. History provided by:  Patient   used: No    Cough  Cough characteristics:  Dry  Severity:  Moderate  Onset quality:  Gradual  Duration:  2 weeks  Timing:  Constant  Progression:  Unchanged  Chronicity:  New  Relieved by:  Nothing  Worsened by:  Nothing  Ineffective treatments:  None tried  Associated symptoms: no chest pain, no chills, no ear fullness, no ear pain, no eye discharge, no fever, no headaches, no myalgias, no rash, no rhinorrhea, no shortness of breath, no sinus congestion, no sore throat and no wheezing        Prior to Admission Medications   Prescriptions Last Dose Informant Patient Reported? Taking? buPROPion (WELLBUTRIN XL) 300 mg 24 hr tablet  Self Yes No   Sig: Take 300 mg by mouth daily   escitalopram (LEXAPRO) 10 mg tablet  Self Yes No   Sig: Take 10 mg by mouth daily   lisinopril (ZESTRIL) 20 mg tablet   No No   Sig: Take 1 tablet (20 mg total) by mouth daily   pantoprazole (PROTONIX) 40 mg tablet   No No   Sig: Take 1 tablet (40 mg total) by mouth 2 (two) times a day before meals   pregabalin (LYRICA) 75 mg capsule   Yes No   Sig: Take 75 mg by mouth 2 (two) times a day   traMADol (ULTRAM) 50 mg tablet  Self Yes No   Sig: Take 50 mg by mouth every 6 (six) hours as needed for moderate pain      Facility-Administered Medications: None       Past Medical History:   Diagnosis Date    Hypertension     Psychiatric disorder     Ulcer, stomal        Past Surgical History:   Procedure Laterality Date    HERNIA REPAIR      WISDOM TOOTH EXTRACTION         History reviewed. No pertinent family history. I have reviewed and agree with the history as documented. E-Cigarette/Vaping    E-Cigarette Use Never User      E-Cigarette/Vaping Substances     Social History     Tobacco Use    Smoking status: Never    Smokeless tobacco: Never   Vaping Use    Vaping Use: Never used   Substance Use Topics    Alcohol use: Not Currently    Drug use: Not Currently       Review of Systems   Constitutional:  Negative for chills and fever. HENT:  Negative for ear pain, hearing loss, rhinorrhea, sore throat, trouble swallowing and voice change. Eyes:  Negative for pain and discharge. Respiratory:  Positive for cough. Negative for shortness of breath and wheezing. Cardiovascular:  Negative for chest pain and palpitations. Gastrointestinal:  Negative for abdominal pain, blood in stool, constipation, diarrhea, nausea and vomiting. Genitourinary:  Negative for dysuria, flank pain, frequency and hematuria. Musculoskeletal:  Negative for joint swelling, myalgias, neck pain and neck stiffness. Skin:  Negative for rash and wound. Neurological:  Negative for dizziness, seizures, syncope, facial asymmetry and headaches. Psychiatric/Behavioral:  Negative for hallucinations, self-injury and suicidal ideas. All other systems reviewed and are negative. Physical Exam  Physical Exam  Vitals and nursing note reviewed. Constitutional:       General: He is not in acute distress. Appearance: He is well-developed. HENT:      Head: Normocephalic and atraumatic. Right Ear: External ear normal.      Left Ear: External ear normal.   Eyes:      General: No scleral icterus. Right eye: No discharge. Left eye: No discharge. Extraocular Movements: Extraocular movements intact. Conjunctiva/sclera: Conjunctivae normal.   Cardiovascular:      Rate and Rhythm: Normal rate and regular rhythm. Heart sounds: Normal heart sounds. No murmur heard.   Pulmonary:      Effort: Pulmonary effort is normal.      Breath sounds: Normal breath sounds. No wheezing or rales. Abdominal:      General: Bowel sounds are normal. There is no distension. Palpations: Abdomen is soft. Tenderness: There is no abdominal tenderness. There is no guarding or rebound. Musculoskeletal:         General: No deformity. Normal range of motion. Cervical back: Normal range of motion and neck supple. Skin:     General: Skin is warm and dry. Findings: No rash. Neurological:      General: No focal deficit present. Mental Status: He is alert and oriented to person, place, and time. Cranial Nerves: No cranial nerve deficit. Psychiatric:         Mood and Affect: Mood normal.         Behavior: Behavior normal.         Thought Content:  Thought content normal.         Judgment: Judgment normal.         Vital Signs  ED Triage Vitals [12/06/23 2257]   Temperature Pulse Respirations Blood Pressure SpO2   97.9 °F (36.6 °C) (!) 120 18 143/90 97 %      Temp Source Heart Rate Source Patient Position - Orthostatic VS BP Location FiO2 (%)   Temporal Monitor Sitting Right arm --      Pain Score       --           Vitals:    12/06/23 2257   BP: 143/90   Pulse: (!) 120   Patient Position - Orthostatic VS: Sitting         Visual Acuity      ED Medications  Medications   methylPREDNISolone sodium succinate (Solu-MEDROL) injection 125 mg (125 mg Intravenous Given 12/6/23 2331)   sodium chloride 0.9 % bolus 1,000 mL (has no administration in time range)   ipratropium-albuterol (DUO-NEB) 0.5-2.5 mg/3 mL inhalation solution 3 mL (3 mL Nebulization Given 12/6/23 2330)       Diagnostic Studies  Results Reviewed       Procedure Component Value Units Date/Time    CBC and differential [[id number]] Collected: 12/06/23 2330    Lab Status: No result Specimen: Blood from Arm, Right     Comprehensive metabolic panel [[id number]] Collected: 12/06/23 2330    Lab Status: No result Specimen: Blood from Arm, Right COVID19, Influenza A/B, RSV PCR, [facility] [[id number]] Collected: 12/06/23 2330    Lab Status: No result Specimen: Nares from Nose                    XR chest pa & lateral   ED Interpretation by Janine Sr MD (12/06 2329)   No acute finding                 Procedures  Procedures         ED Course  ED Course as of 12/06/23 2332   Wed Dec 06, 2023   2331 Signed out to Dr. Opal Childress pending lab work, COVID testing, nebulization                               SBIRT 22yo+      [employer] Most Recent Value   Initial Alcohol Screen: US AUDIT-C     1. How often do you have a drink containing alcohol? 0 Filed at: 12/06/2023 2258   2. How many drinks containing alcohol do you have on a typical day you are drinking? 0 Filed at: 12/06/2023 2258   3a. Male UNDER 65: How often do you have five or more drinks on one occasion? 0 Filed at: 12/06/2023 2258   Audit-C Score 0 Filed at: 12/06/2023 2258   ALLISON: How many times in the past year have you. .. Used an illegal drug or used a prescription medication for non-medical reasons? Never Filed at: 12/06/2023 2258                      Medical Decision Making  Based on the history and medical screening exam performed the diagnostic considerations include but are not limited to COVID/flu/RSV, pneumonia, asthma exacerbation, postviral inflammatory cough. .    Based on the work-up performed in the emergency room which includes physical examination, and which may include laboratory studies and imaging as warranted including advanced imaging such as CT scan or ultrasound, the differential diagnosis is narrowed to exclude limb or life-threatening process. The patient is stable for discharge. Amount and/or Complexity of Data Reviewed  Labs: ordered. Decision-making details documented in ED Course. Details: Pending at time of signout  Radiology: ordered and independent interpretation performed. Decision-making details documented in ED Course.      Details: Chest x-ray negative             Disposition  Final diagnoses:   Cough     Time reflects when diagnosis was documented in both MDM as applicable and the Disposition within this note       Time User Action Codes Description Comment    12/6/2023 11:30 PM Filipe Schumacher Add [R05.9] Cough           ED Disposition       ED Disposition   Discharge    Condition   Stable    Date/Time   Wed Dec 6, 2023 [address] discharge to home/self care. Follow-up Information       Follow up With Specialties Details Why Contact Info    Ysabel Chase MD Transplant   [address]  [facility] [address]  [phone]              Patient's Medications   Discharge Prescriptions    AZITHROMYCIN (ZITHROMAX) 250 MG TABLET    Take 2 tablets today then 1 tablet daily x 4 days       Start Date: 12/6/2023 End Date: 12/10/2023       Order Dose: --       Quantity: 6 tablet    Refills: 0    METHYLPREDNISOLONE 4 MG TABLET THERAPY PACK    Use as directed on package       Start Date: 12/6/2023 End Date: --       Order Dose: --       Quantity: 21 tablet    Refills: 0       No discharge procedures on file.     PDMP Review       None            ED Provider  Electronically Signed by             Filipe Schumacher MD  12/06/23 9699

## 2024-09-12 ENCOUNTER — OPTICAL OFFICE (OUTPATIENT)
Dept: URBAN - NONMETROPOLITAN AREA CLINIC 4 | Facility: CLINIC | Age: 49
Setting detail: OPHTHALMOLOGY
End: 2024-09-12

## 2024-09-12 ENCOUNTER — RX ONLY (RX ONLY)
Age: 49
End: 2024-09-12

## 2024-09-12 ENCOUNTER — DOCTOR'S OFFICE (OUTPATIENT)
Dept: URBAN - NONMETROPOLITAN AREA CLINIC 1 | Facility: CLINIC | Age: 49
Setting detail: OPHTHALMOLOGY
End: 2024-09-12
Payer: COMMERCIAL

## 2024-09-12 ENCOUNTER — OPTICAL OFFICE (OUTPATIENT)
Dept: URBAN - NONMETROPOLITAN AREA CLINIC 4 | Facility: CLINIC | Age: 49
Setting detail: OPHTHALMOLOGY
End: 2024-09-12
Payer: COMMERCIAL

## 2024-09-12 DIAGNOSIS — H52.4: ICD-10-CM

## 2024-09-12 DIAGNOSIS — H52.223: ICD-10-CM

## 2024-09-12 DIAGNOSIS — H52.7: ICD-10-CM

## 2024-09-12 DIAGNOSIS — H25.13: ICD-10-CM

## 2024-09-12 PROBLEM — H04.123 DRY EYE SYNDROME LACRIMAL GLAND; BOTH EYES: Status: ACTIVE | Noted: 2024-09-12

## 2024-09-12 PROCEDURE — V2784 LENS POLYCARB OR EQUAL: HCPCS

## 2024-09-12 PROCEDURE — 92015 DETERMINE REFRACTIVE STATE: CPT

## 2024-09-12 PROCEDURE — V2784 LENS POLYCARB OR EQUAL: HCPCS | Mod: LT

## 2024-09-12 PROCEDURE — V2799T TAXABLE VISION SERVICE MISCELLANEOUS: Performed by: OPTOMETRIST

## 2024-09-12 PROCEDURE — V2204 LENS SPHCY BIFOCAL 4.00D/2.1: HCPCS | Mod: LT

## 2024-09-12 PROCEDURE — V2205 LENS SPHCY BIFOCAL 4.00D/4.2: HCPCS | Mod: RT

## 2024-09-12 PROCEDURE — 92004 COMPRE OPH EXAM NEW PT 1/>: CPT

## 2024-09-12 PROCEDURE — V2020 VISION SVCS FRAMES PURCHASES: HCPCS

## 2024-09-12 ASSESSMENT — REFRACTION_AUTOREFRACTION
OD_AXIS: 017
OD_SPHERE: -0.25
OS_CYLINDER: -3.75
OS_SPHERE: -0.75
OD_CYLINDER: -4.50
OS_AXIS: 174

## 2024-09-12 ASSESSMENT — REFRACTION_MANIFEST
OD_VA1: 20/25-2
OS_VA2: 20/25
OS_AXIS: 175
OD_VA2: 20/25
OS_SPHERE: -0.50
OD_SPHERE: PL
OS_ADD: +1.75
OU_VA: 20/25
OS_VA1: 20/25-2
OS_CYLINDER: -3.50
OD_AXIS: 010
OD_ADD: +1.75
OD_CYLINDER: -4.75

## 2024-09-12 ASSESSMENT — TONOMETRY
OS_IOP_MMHG: 18
OD_IOP_MMHG: 18

## 2024-09-12 ASSESSMENT — CONFRONTATIONAL VISUAL FIELD TEST (CVF)
OS_FINDINGS: FULL
OD_FINDINGS: FULL

## 2024-09-12 ASSESSMENT — VISUAL ACUITY
OD_BCVA: 20/50-2
OS_BCVA: 20/60

## 2025-01-31 ENCOUNTER — APPOINTMENT (EMERGENCY)
Dept: CT IMAGING | Facility: HOSPITAL | Age: 50
End: 2025-01-31
Payer: COMMERCIAL

## 2025-01-31 ENCOUNTER — HOSPITAL ENCOUNTER (EMERGENCY)
Facility: HOSPITAL | Age: 50
Discharge: HOME | End: 2025-01-31
Attending: EMERGENCY MEDICINE
Payer: COMMERCIAL

## 2025-01-31 VITALS
SYSTOLIC BLOOD PRESSURE: 124 MMHG | TEMPERATURE: 97.8 F | DIASTOLIC BLOOD PRESSURE: 82 MMHG | HEART RATE: 81 BPM | RESPIRATION RATE: 16 BRPM | OXYGEN SATURATION: 98 %

## 2025-01-31 DIAGNOSIS — S09.90XA INJURY OF HEAD, INITIAL ENCOUNTER: Primary | ICD-10-CM

## 2025-01-31 DIAGNOSIS — S01.01XA LACERATION OF SCALP, INITIAL ENCOUNTER: ICD-10-CM
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
## 2025-01-31 PROCEDURE — 70450 CT HEAD/BRAIN W/O DYE: CPT

## 2025-01-31 PROCEDURE — 99284 EMERGENCY DEPT VISIT MOD MDM: CPT

## 2025-01-31 PROCEDURE — 99284 EMERGENCY DEPT VISIT MOD MDM: CPT | Performed by: EMERGENCY MEDICINE

## 2025-01-31 PROCEDURE — 90715 TDAP VACCINE 7 YRS/> IM: CPT | Performed by: EMERGENCY MEDICINE

## 2025-01-31 PROCEDURE — 12002 RPR S/N/AX/GEN/TRNK2.6-7.5CM: CPT | Performed by: EMERGENCY MEDICINE

## 2025-01-31 PROCEDURE — 90471 IMMUNIZATION ADMIN: CPT

## 2025-01-31 RX ORDER — OXYCODONE AND ACETAMINOPHEN 5; 325 MG/1; MG/1
1 TABLET ORAL ONCE
Refills: 0 | Status: COMPLETED | OUTPATIENT
Start: 2025-01-31 | End: 2025-01-31

## 2025-01-31 RX ADMIN — OXYCODONE HYDROCHLORIDE AND ACETAMINOPHEN 1 TABLET: 5; 325 TABLET ORAL at 19:41

## 2025-01-31 RX ADMIN — TETANUS TOXOID, REDUCED DIPHTHERIA TOXOID AND ACELLULAR PERTUSSIS VACCINE, ADSORBED 0.5 ML: 5; 2.5; 8; 8; 2.5 SUSPENSION INTRAMUSCULAR at 19:41

## 2025-02-01 NOTE — ED PROVIDER NOTES
Time reflects when diagnosis was documented in both MDM as applicable and the Disposition within this note       Time User Action Codes Description Comment    1/31/2025  8:13 PM Nick Carr Add [W19.XXXA] Fall, initial encounter     1/31/2025  8:13 PM Nick Carr Add [S09.90XA] Injury of head, initial encounter     1/31/2025  8:13 PM Nick Carr Modify [W19.XXXA] Fall, initial encounter     1/31/2025  8:13 PM Nick Carr Modify [S09.90XA] Injury of head, initial encounter     1/31/2025  8:13 PM Nick Carr Add [S01.01XA] Laceration of scalp, initial encounter           ED Disposition       ED Disposition   Discharge    Condition   Stable    Date/Time   Fri Jan 31, 2025  8:13 PM    Comment   [place] discharge to home/self care.                   Assessment & Plan       Medical Decision Making  1923: Patient appears well, vital signs reviewed.  Patient sustained mechanical fall prior to arrival with head injury.  No loss consciousness.  No neck tenderness or pain.  Full range of motion of the head.  Patient did sustain 3 cm laceration to the right frontal scalp.  Plan to repair primarily, see procedure note for full details.  I will update his tetanus status.  Plan to complete CT head.  I will give analgesics for his discomfort and reevaluate.      2015: CT reviewed.  Pain well-controlled.  Wound repaired, see procedure note for full details.  Wound care instructed.  Stable for discharge.       Amount and/or Complexity of Data Reviewed  Radiology: ordered.     Details: CT head--no intracranial hemorrhage    Risk  Prescription drug management.             Medications   oxyCODONE-acetaminophen (PERCOCET) 5-325 mg per tablet 1 tablet (1 tablet Oral Given 1/31/25 1941)   tetanus-diphtheria-acellular pertussis (BOOSTRIX) IM injection 0.5 mL (0.5 mL Intramuscular Given 1/31/25 1941)       ED Risk Strat Scores                          SBIRT 22yo+      Flowsheet Row Most Recent Value   Initial Alcohol  Screen: US AUDIT-C     1. How often do you have a drink containing alcohol? 0 Filed at: 01/31/2025 1839   2. How many drinks containing alcohol do you have on a typical day you are drinking?  0 Filed at: 01/31/2025 1839   3a. Male UNDER 65: How often do you have five or more drinks on one occasion? 0 Filed at: 01/31/2025 1839   Audit-C Score 0 Filed at: 01/31/2025 1839   ALLISON: How many times in the past year have you...    Used an illegal drug or used a prescription medication for non-medical reasons? Never Filed at: 01/31/2025 1839                            History of Present Illness       Chief Complaint   Patient presents with    Fall     Tripped over a dog bone and hit head on coffee table, unsure of LOC. No thinners. Reports head hearts near his R ear +dizziness.  Unsure if UTD on tetanus, lac to R posterior head, minimal bleeding in triage       Past Medical History:   Diagnosis Date    Hypertension     Psychiatric disorder     Ulcer, stomal       Past Surgical History:   Procedure Laterality Date    HERNIA REPAIR      WISDOM TOOTH EXTRACTION        History reviewed. No pertinent family history.   Social History     Tobacco Use    Smoking status: Never    Smokeless tobacco: Never   Vaping Use    Vaping status: Never Used   Substance Use Topics    Alcohol use: Not Currently    Drug use: Not Currently      E-Cigarette/Vaping    E-Cigarette Use Never User       E-Cigarette/Vaping Substances      I have reviewed and agree with the history as documented.       History provided by:  Medical records, patient and spouse  Fall  Mechanism of injury: fall    Injury location:  Head/neck  Head/neck injury location:  Scalp  Incident location:  Home  Time since incident:  2 hours  Arrived directly from scene: yes    Fall:     Fall occurred:  Walking and tripped    Height of fall:  GLF    Impact surface:  Furniture (coffee table)    Point of impact:  Head    Entrapped after fall: no    Protective equipment: none    Suspicion  of alcohol use: no    Suspicion of drug use: no    Tetanus status:  Unknown  Prior to arrival data:     Bystander interventions:  First aid    Patient ambulatory at scene: yes      Blood loss:  Minimal    Responsiveness at scene:  Alert    Orientation at scene:  Person, place, time and situation    Loss of consciousness: no      Amnesic to event: no      Airway interventions:  None    Airway condition since incident:  Stable    Breathing condition since incident:  Stable    Circulation condition since incident:  Stable    Mental status condition since incident:  Stable    Disability condition since incident:  Stable  Associated symptoms: headaches    Associated symptoms: no abdominal pain, no back pain, no blindness, no chest pain, no difficulty breathing, no hearing loss, no loss of consciousness, no nausea, no neck pain, no seizures and no vomiting    Risk factors: no anticoagulation therapy        Review of Systems   Constitutional:  Negative for appetite change, chills, fatigue and fever.   HENT:  Negative for ear pain, hearing loss, rhinorrhea, sore throat and trouble swallowing.    Eyes:  Negative for blindness, pain, discharge and visual disturbance.   Respiratory:  Negative for cough, chest tightness and shortness of breath.    Cardiovascular:  Negative for chest pain and palpitations.   Gastrointestinal:  Negative for abdominal pain, nausea and vomiting.   Endocrine: Negative for polydipsia, polyphagia and polyuria.   Genitourinary:  Negative for difficulty urinating, dysuria, hematuria and testicular pain.   Musculoskeletal:  Negative for arthralgias, back pain and neck pain.   Skin:  Positive for wound. Negative for color change and rash.   Allergic/Immunologic: Negative for immunocompromised state.   Neurological:  Positive for headaches. Negative for dizziness, seizures, loss of consciousness, syncope and weakness.   Hematological:  Negative for adenopathy.   Psychiatric/Behavioral:  Negative for  confusion and dysphoric mood.    All other systems reviewed and are negative.          Objective       ED Triage Vitals [01/31/25 1839]   Temperature Pulse Blood Pressure Respirations SpO2 Patient Position - Orthostatic VS   97.8 °F (36.6 °C) 100 (!) 134/101 18 99 % --      Temp Source Heart Rate Source BP Location FiO2 (%) Pain Score    Temporal -- -- -- 5      Vitals      Date and Time Temp Pulse SpO2 Resp BP Pain Score FACES Pain Rating User   01/31/25 2013 -- -- -- -- -- 5 -- LAK   01/31/25 2000 -- 81 98 % 16 124/82 -- -- LAK   01/31/25 1941 -- -- -- -- -- 5 -- [facility]   01/31/25 1839 97.8 °F (36.6 °C) 100 99 % 18 134/101 5 -- KK            Physical Exam  Vitals and nursing note reviewed.   Constitutional:       General: He is not in acute distress.     Appearance: Normal appearance. He is not ill-appearing, toxic-appearing or diaphoretic.   HENT:      Head: Normocephalic.      Comments: 3 cm laceration to right frontal scalp     Right Ear: External ear normal.      Left Ear: External ear normal.      Nose: Nose normal. No congestion or rhinorrhea.      Mouth/Throat:      Mouth: Mucous membranes are moist.      Pharynx: Oropharynx is clear. No oropharyngeal exudate or posterior oropharyngeal erythema.   Eyes:      General:         Right eye: No discharge.         Left eye: No discharge.      Extraocular Movements: Extraocular movements intact.      Conjunctiva/sclera: Conjunctivae normal.      Pupils: Pupils are equal, round, and reactive to light.   Cardiovascular:      Rate and Rhythm: Normal rate and regular rhythm.      Pulses: Normal pulses.      Heart sounds: Normal heart sounds. No murmur heard.     No gallop.   Pulmonary:      Effort: Pulmonary effort is normal. No respiratory distress.      Breath sounds: Normal breath sounds. No stridor. No wheezing, rhonchi or rales.   Chest:      Chest wall: No tenderness.   Abdominal:      General: Bowel sounds are normal. There is no distension.      Palpations:  "Abdomen is soft. There is no mass.      Tenderness: There is no abdominal tenderness. There is no right CVA tenderness, left CVA tenderness, guarding or rebound.      Hernia: No hernia is present.   Musculoskeletal:         General: Normal range of motion.      Cervical back: Normal range of motion and neck supple. No rigidity or tenderness.   Lymphadenopathy:      Cervical: No cervical adenopathy.   Skin:     General: Skin is warm and dry.      Capillary Refill: Capillary refill takes less than 2 seconds.   Neurological:      General: No focal deficit present.      Mental Status: He is alert and oriented to person, place, and time.      Cranial Nerves: No cranial nerve deficit.      Sensory: No sensory deficit.      Motor: No weakness.      Coordination: Coordination normal.      Gait: Gait normal.      Deep Tendon Reflexes: Reflexes normal.   Psychiatric:         Mood and Affect: Mood normal.         Behavior: Behavior normal.         Thought Content: Thought content normal.         Judgment: Judgment normal.         Results Reviewed       None            CT head without contrast   Final Interpretation by Vinod Mejia MD (01/31 1957)      No acute intracranial abnormality.                  Workstation performed: [id number]             Universal Protocol:  procedure performed by consultantConsent: Verbal consent obtained.  Risks and benefits: risks, benefits and alternatives were discussed  Consent given by: patient and spouse  Time out: Immediately prior to procedure a \"time out\" was called to verify the correct patient, procedure, equipment, support staff and site/side marked as required.  Timeout called at: 1/31/2025 8:15 PM.  Patient identity confirmed: verbally with patient, arm band, provided demographic data and hospital-assigned identification number  Laceration repair    Date/Time: 1/31/2025 8:15 PM    Performed by: Nick Carr MD  Authorized by: Nick Carr MD  Body area: " head/neck  Location details: scalp  Laceration length: 3 cm  Foreign bodies: no foreign bodies  Tendon involvement: none  Nerve involvement: none  Vascular damage: no  Anesthesia: local infiltration    Anesthesia:  Local Anesthetic: lidocaine 1% without epinephrine  Anesthetic total: 3 mL    Sedation:  Patient sedated: no        Procedure Details:  Preparation: Patient was prepped and draped in the usual sterile fashion.  Irrigation solution: saline  Irrigation method: tap  Amount of cleaning: standard  Debridement: none  Degree of undermining: none  Skin closure: staples (6 staples)  Approximation: close  Approximation difficulty: simple  Patient tolerance: patient tolerated the procedure well with no immediate complications          ED Medication and Procedure Management   Prior to Admission Medications   Prescriptions Last Dose Informant Patient Reported? Taking?   buPROPion (WELLBUTRIN XL) 300 mg 24 hr tablet  Self Yes No   Sig: Take 300 mg by mouth daily   escitalopram (LEXAPRO) 10 mg tablet  Self Yes No   Sig: Take 10 mg by mouth daily   lisinopril (ZESTRIL) 20 mg tablet   No No   Sig: Take 1 tablet (20 mg total) by mouth daily   methylPREDNISolone 4 MG tablet therapy pack   No No   Sig: Use as directed on package   pantoprazole (PROTONIX) 40 mg tablet   No No   Sig: Take 1 tablet (40 mg total) by mouth 2 (two) times a day before meals   pregabalin (LYRICA) 75 mg capsule   Yes No   Sig: Take 75 mg by mouth 2 (two) times a day   traMADol (ULTRAM) 50 mg tablet  Self Yes No   Sig: Take 50 mg by mouth every 6 (six) hours as needed for moderate pain      Facility-Administered Medications: None     Discharge Medication List as of 1/31/2025  8:15 PM        CONTINUE these medications which have NOT CHANGED    Details   buPROPion (WELLBUTRIN XL) 300 mg 24 hr tablet Take 300 mg by mouth daily, Historical Med      escitalopram (LEXAPRO) 10 mg tablet Take 10 mg by mouth daily, Historical Med      lisinopril (ZESTRIL) 20  mg tablet Take 1 tablet (20 mg total) by mouth daily, Starting Thu 11/10/2022, Until Sat 12/10/2022, Normal      methylPREDNISolone 4 MG tablet therapy pack Use as directed on package, Normal      pantoprazole (PROTONIX) 40 mg tablet Take 1 tablet (40 mg total) by mouth 2 (two) times a day before meals, Starting Thu 11/10/2022, Until Thu 1/5/2023, Normal      pregabalin (LYRICA) 75 mg capsule Take 75 mg by mouth 2 (two) times a day, Historical Med      traMADol (ULTRAM) 50 mg tablet Take 50 mg by mouth every 6 (six) hours as needed for moderate pain, Historical Med           No discharge procedures on file.  ED SEPSIS DOCUMENTATION   Time reflects when diagnosis was documented in both MDM as applicable and the Disposition within this note       Time User Action Codes Description Comment    1/31/2025  8:13 PM Nick Carr [W19.XXXA] Fall, initial encounter     1/31/2025  8:13 PM Nick Carr [S09.90XA] Injury of head, initial encounter     1/31/2025  8:13 PM Nick Carr Modify [W19.XXXA] Fall, initial encounter     1/31/2025  8:13 PM Nick Carr Modify [S09.90XA] Injury of head, initial encounter     1/31/2025  8:13 PM Nick Carr [S01.01XA] Laceration of scalp, initial encounter                  Nick Carr MD  01/31/25 2028